# Patient Record
Sex: MALE | Race: WHITE | NOT HISPANIC OR LATINO
[De-identification: names, ages, dates, MRNs, and addresses within clinical notes are randomized per-mention and may not be internally consistent; named-entity substitution may affect disease eponyms.]

---

## 2017-01-09 ENCOUNTER — LABORATORY RESULT (OUTPATIENT)
Age: 67
End: 2017-01-09

## 2017-01-10 ENCOUNTER — APPOINTMENT (OUTPATIENT)
Dept: NEPHROLOGY | Facility: CLINIC | Age: 67
End: 2017-01-10

## 2017-01-10 VITALS — SYSTOLIC BLOOD PRESSURE: 132 MMHG | DIASTOLIC BLOOD PRESSURE: 82 MMHG

## 2017-01-10 VITALS
DIASTOLIC BLOOD PRESSURE: 86 MMHG | BODY MASS INDEX: 31.07 KG/M2 | OXYGEN SATURATION: 95 % | WEIGHT: 205.03 LBS | HEART RATE: 89 BPM | HEIGHT: 68 IN | SYSTOLIC BLOOD PRESSURE: 135 MMHG

## 2017-01-10 DIAGNOSIS — R39.9 UNSPECIFIED SYMPTOMS AND SIGNS INVOLVING THE GENITOURINARY SYSTEM: ICD-10-CM

## 2017-01-17 ENCOUNTER — APPOINTMENT (OUTPATIENT)
Dept: HEMATOLOGY ONCOLOGY | Facility: CLINIC | Age: 67
End: 2017-01-17

## 2017-01-17 VITALS
DIASTOLIC BLOOD PRESSURE: 70 MMHG | BODY MASS INDEX: 31.93 KG/M2 | WEIGHT: 210 LBS | TEMPERATURE: 98.4 F | HEART RATE: 96 BPM | SYSTOLIC BLOOD PRESSURE: 138 MMHG

## 2017-02-06 ENCOUNTER — LABORATORY RESULT (OUTPATIENT)
Age: 67
End: 2017-02-06

## 2017-02-13 LAB
ALBUMIN SERPL ELPH-MCNC: 4.9 G/DL
ALP BLD-CCNC: 52 U/L
ALT SERPL-CCNC: 30 U/L
ANION GAP SERPL CALC-SCNC: 15 MMOL/L
APPEARANCE: CLEAR
AST SERPL-CCNC: 20 U/L
BACTERIA: NEGATIVE
BILIRUB SERPL-MCNC: 0.6 MG/DL
BILIRUBIN URINE: NEGATIVE
BLOOD URINE: NEGATIVE
BUN SERPL-MCNC: 22 MG/DL
CALCIUM SERPL-MCNC: 10.2 MG/DL
CHLORIDE SERPL-SCNC: 98 MMOL/L
CO2 SERPL-SCNC: 30 MMOL/L
COLOR: YELLOW
CREAT SERPL-MCNC: 1.49 MG/DL
CREAT SPEC-SCNC: 74 MG/DL
CREAT SPEC-SCNC: 74 MG/DL
CREAT/PROT UR: NORMAL RATIO
GLUCOSE QUALITATIVE U: NORMAL MG/DL
GLUCOSE SERPL-MCNC: 120 MG/DL
HYALINE CASTS: 0 /LPF
IGA 24H UR QL IFE: NORMAL
KETONES URINE: NEGATIVE
LEUKOCYTE ESTERASE URINE: NEGATIVE
MAGNESIUM SERPL-MCNC: 2 MG/DL
MICROALBUMIN 24H UR DL<=1MG/L-MCNC: 0.5 MG/DL
MICROALBUMIN/CREAT 24H UR-RTO: 7 UG/MG
MICROSCOPIC-UA: NORMAL
NITRITE URINE: NEGATIVE
OSMOLALITY SERPL: 298 MOS/KG
OSMOLALITY UR: 385 MOS/KG
PH URINE: 5.5
PHOSPHATE SERPL-MCNC: 3.8 MG/DL
POTASSIUM SERPL-SCNC: 5.2 MMOL/L
POTASSIUM UR-SCNC: 35 MMOL/L
PROT SERPL-MCNC: 7.4 G/DL
PROT UR-MCNC: <4 MG/DL
PROTEIN URINE: NEGATIVE MG/DL
RED BLOOD CELLS URINE: 0 /HPF
SODIUM ?TM SUB UR QN: 70 MMOL/L
SODIUM SERPL-SCNC: 143 MMOL/L
SPECIFIC GRAVITY URINE: 1.01
SQUAMOUS EPITHELIAL CELLS: 0 /HPF
URATE SERPL-MCNC: 8.6 MG/DL
UROBILINOGEN URINE: NORMAL MG/DL
WHITE BLOOD CELLS URINE: 0 /HPF

## 2017-02-14 ENCOUNTER — LABORATORY RESULT (OUTPATIENT)
Age: 67
End: 2017-02-14

## 2017-02-15 LAB
ALBUMIN SERPL ELPH-MCNC: 4.5 G/DL
ALP BLD-CCNC: 49 U/L
ALT SERPL-CCNC: 78 U/L
AST SERPL-CCNC: 61 U/L
BASOPHILS # BLD AUTO: 0.19 K/UL
BASOPHILS NFR BLD AUTO: 2.6 %
BILIRUB DIRECT SERPL-MCNC: 0.2 MG/DL
BILIRUB INDIRECT SERPL-MCNC: 0.4 MG/DL
BILIRUB SERPL-MCNC: 0.6 MG/DL
CREAT SERPL-MCNC: 1.52 MG/DL
EOSINOPHIL # BLD AUTO: 0.45 K/UL
EOSINOPHIL NFR BLD AUTO: 6.1 %
HCT VFR BLD CALC: 42.3 %
HGB BLD-MCNC: 14.1 G/DL
LYMPHOCYTES # BLD AUTO: 1.02 K/UL
LYMPHOCYTES NFR BLD AUTO: 13.9 %
MAN DIFF?: NORMAL
MCHC RBC-ENTMCNC: 33.3 GM/DL
MCHC RBC-ENTMCNC: 36.7 PG
MCV RBC AUTO: 110.2 FL
MONOCYTES # BLD AUTO: 0.45 K/UL
MONOCYTES NFR BLD AUTO: 6.1 %
NEUTROPHILS # BLD AUTO: 4.92 K/UL
NEUTROPHILS NFR BLD AUTO: 67 %
PLATELET # BLD AUTO: 497 K/UL
PROT SERPL-MCNC: 7.2 G/DL
PSA SERPL-MCNC: 1.17 NG/ML
RBC # BLD: 3.84 M/UL
RBC # FLD: 15.1 %
WBC # FLD AUTO: 7.34 K/UL

## 2017-03-13 ENCOUNTER — LABORATORY RESULT (OUTPATIENT)
Age: 67
End: 2017-03-13

## 2017-03-15 ENCOUNTER — OTHER (OUTPATIENT)
Age: 67
End: 2017-03-15

## 2017-04-03 ENCOUNTER — APPOINTMENT (OUTPATIENT)
Dept: FAMILY MEDICINE | Facility: CLINIC | Age: 67
End: 2017-04-03

## 2017-04-03 VITALS — RESPIRATION RATE: 14 BRPM

## 2017-04-03 VITALS
HEART RATE: 75 BPM | SYSTOLIC BLOOD PRESSURE: 140 MMHG | HEIGHT: 68 IN | TEMPERATURE: 98.9 F | DIASTOLIC BLOOD PRESSURE: 90 MMHG | OXYGEN SATURATION: 94 %

## 2017-04-03 DIAGNOSIS — K43.2 INCISIONAL HERNIA W/OUT OBSTRUCTION OR GANGRENE: ICD-10-CM

## 2017-05-16 ENCOUNTER — LABORATORY RESULT (OUTPATIENT)
Age: 67
End: 2017-05-16

## 2017-05-22 ENCOUNTER — APPOINTMENT (OUTPATIENT)
Dept: HEMATOLOGY ONCOLOGY | Facility: CLINIC | Age: 67
End: 2017-05-22

## 2017-05-22 VITALS
HEART RATE: 76 BPM | SYSTOLIC BLOOD PRESSURE: 150 MMHG | DIASTOLIC BLOOD PRESSURE: 70 MMHG | BODY MASS INDEX: 33.35 KG/M2 | HEIGHT: 66.5 IN | WEIGHT: 210 LBS | RESPIRATION RATE: 16 BRPM | TEMPERATURE: 98.5 F

## 2017-05-22 DIAGNOSIS — Z87.891 PERSONAL HISTORY OF NICOTINE DEPENDENCE: ICD-10-CM

## 2017-05-23 ENCOUNTER — APPOINTMENT (OUTPATIENT)
Dept: HEMATOLOGY ONCOLOGY | Facility: CLINIC | Age: 67
End: 2017-05-23

## 2017-05-30 LAB
ALBUMIN SERPL ELPH-MCNC: 5.4 G/DL
ALP BLD-CCNC: 62 U/L
ALT SERPL-CCNC: 42 U/L
ANION GAP SERPL CALC-SCNC: 19 MMOL/L
APPEARANCE: CLEAR
AST SERPL-CCNC: 27 U/L
BACTERIA: NEGATIVE
BILIRUB SERPL-MCNC: 0.5 MG/DL
BILIRUBIN URINE: NEGATIVE
BLOOD URINE: NEGATIVE
BUN SERPL-MCNC: 21 MG/DL
CALCIUM SERPL-MCNC: 10.4 MG/DL
CALCIUM SERPL-MCNC: 10.4 MG/DL
CHLORIDE SERPL-SCNC: 99 MMOL/L
CHOLEST SERPL-MCNC: 266 MG/DL
CHOLEST/HDLC SERPL: 5.1 RATIO
CO2 SERPL-SCNC: 26 MMOL/L
COLOR: YELLOW
CREAT SERPL-MCNC: 1.48 MG/DL
CREAT SPEC-SCNC: 85 MG/DL
CREAT/PROT UR: 0.1
GLUCOSE QUALITATIVE U: NORMAL MG/DL
GLUCOSE SERPL-MCNC: 103 MG/DL
HBA1C MFR BLD HPLC: 6.3 %
HDLC SERPL-MCNC: 52 MG/DL
HYALINE CASTS: 1 /LPF
IGA 24H UR QL IFE: NORMAL
KETONES URINE: NEGATIVE
LDLC SERPL CALC-MCNC: 190 MG/DL
LEUKOCYTE ESTERASE URINE: NEGATIVE
MAGNESIUM SERPL-MCNC: 2.1 MG/DL
MICROSCOPIC-UA: NORMAL
NITRITE URINE: NEGATIVE
OSMOLALITY SERPL: 308 MOS/KG
OSMOLALITY UR: 499 MOS/KG
PARATHYROID HORMONE INTACT: 51 PG/ML
PH URINE: 5.5
PHOSPHATE SERPL-MCNC: 3.8 MG/DL
POTASSIUM SERPL-SCNC: 5 MMOL/L
POTASSIUM UR-SCNC: 51 MMOL/L
PROT SERPL-MCNC: 7.9 G/DL
PROT UR-MCNC: 6 MG/DL
PROTEIN URINE: NEGATIVE MG/DL
PSA FREE FLD-MCNC: 20
PSA FREE SERPL-MCNC: 0.25 NG/ML
PSA SERPL-MCNC: 1.25 NG/ML
RED BLOOD CELLS URINE: 1 /HPF
SODIUM ?TM SUB UR QN: 64 MMOL/L
SODIUM SERPL-SCNC: 144 MMOL/L
SPECIFIC GRAVITY URINE: 1.01
SQUAMOUS EPITHELIAL CELLS: 0 /HPF
TRIGL SERPL-MCNC: 119 MG/DL
URATE SERPL-MCNC: 10.2 MG/DL
UROBILINOGEN URINE: NORMAL MG/DL
WHITE BLOOD CELLS URINE: 1 /HPF

## 2017-06-13 ENCOUNTER — APPOINTMENT (OUTPATIENT)
Dept: NEPHROLOGY | Facility: CLINIC | Age: 67
End: 2017-06-13

## 2017-06-13 VITALS
HEIGHT: 66.5 IN | DIASTOLIC BLOOD PRESSURE: 80 MMHG | WEIGHT: 205 LBS | OXYGEN SATURATION: 98 % | BODY MASS INDEX: 32.56 KG/M2 | HEART RATE: 86 BPM | SYSTOLIC BLOOD PRESSURE: 124 MMHG

## 2017-06-13 VITALS — SYSTOLIC BLOOD PRESSURE: 128 MMHG | DIASTOLIC BLOOD PRESSURE: 78 MMHG

## 2017-07-10 ENCOUNTER — LABORATORY RESULT (OUTPATIENT)
Age: 67
End: 2017-07-10

## 2017-07-11 LAB
BASOPHILS # BLD AUTO: 0.42 K/UL
BASOPHILS NFR BLD AUTO: 3.5 %
EOSINOPHIL # BLD AUTO: 1.06 K/UL
EOSINOPHIL NFR BLD AUTO: 8.8 %
HCT VFR BLD CALC: 45.3 %
HGB BLD-MCNC: 15.3 G/DL
LYMPHOCYTES # BLD AUTO: 1.6 K/UL
LYMPHOCYTES NFR BLD AUTO: 13.2 %
MAN DIFF?: NORMAL
MCHC RBC-ENTMCNC: 33.8 GM/DL
MCHC RBC-ENTMCNC: 35.9 PG
MCV RBC AUTO: 106.3 FL
MONOCYTES # BLD AUTO: 0.96 K/UL
MONOCYTES NFR BLD AUTO: 7.9 %
NEUTROPHILS # BLD AUTO: 7.42 K/UL
NEUTROPHILS NFR BLD AUTO: 61.4 %
PLATELET # BLD AUTO: 583 K/UL
RBC # BLD: 4.26 M/UL
RBC # FLD: 14.6 %
WBC # FLD AUTO: 12.09 K/UL

## 2017-08-15 ENCOUNTER — MEDICATION RENEWAL (OUTPATIENT)
Age: 67
End: 2017-08-15

## 2017-08-23 ENCOUNTER — MEDICATION RENEWAL (OUTPATIENT)
Age: 67
End: 2017-08-23

## 2017-09-11 ENCOUNTER — LABORATORY RESULT (OUTPATIENT)
Age: 67
End: 2017-09-11

## 2017-09-12 LAB
ALBUMIN SERPL ELPH-MCNC: 4.9 G/DL
ALP BLD-CCNC: 51 U/L
ALT SERPL-CCNC: 30 U/L
AST SERPL-CCNC: 38 U/L
BASOPHILS # BLD AUTO: 0.46 K/UL
BASOPHILS NFR BLD AUTO: 4.3 %
BILIRUB DIRECT SERPL-MCNC: 0.2 MG/DL
BILIRUB INDIRECT SERPL-MCNC: 0.5 MG/DL
BILIRUB SERPL-MCNC: 0.7 MG/DL
CREAT SERPL-MCNC: 1.42 MG/DL
EOSINOPHIL # BLD AUTO: 0.36 K/UL
EOSINOPHIL NFR BLD AUTO: 3.4 %
HCT VFR BLD CALC: 45.8 %
HGB BLD-MCNC: 15.4 G/DL
LYMPHOCYTES # BLD AUTO: 1.83 K/UL
LYMPHOCYTES NFR BLD AUTO: 17.1 %
MAN DIFF?: NORMAL
MCHC RBC-ENTMCNC: 33.6 GM/DL
MCHC RBC-ENTMCNC: 35.3 PG
MCV RBC AUTO: 105 FL
MONOCYTES # BLD AUTO: 0.64 K/UL
MONOCYTES NFR BLD AUTO: 6 %
NEUTROPHILS # BLD AUTO: 7.21 K/UL
NEUTROPHILS NFR BLD AUTO: 67.5 %
PLATELET # BLD AUTO: 572 K/UL
PROT SERPL-MCNC: 8.1 G/DL
RBC # BLD: 4.36 M/UL
RBC # FLD: 14.8 %
WBC # FLD AUTO: 10.68 K/UL

## 2017-09-22 ENCOUNTER — APPOINTMENT (OUTPATIENT)
Dept: HEMATOLOGY ONCOLOGY | Facility: CLINIC | Age: 67
End: 2017-09-22
Payer: MEDICARE

## 2017-09-22 VITALS
DIASTOLIC BLOOD PRESSURE: 70 MMHG | WEIGHT: 208 LBS | TEMPERATURE: 98.7 F | HEART RATE: 98 BPM | BODY MASS INDEX: 33.07 KG/M2 | SYSTOLIC BLOOD PRESSURE: 144 MMHG

## 2017-09-22 PROCEDURE — 99214 OFFICE O/P EST MOD 30 MIN: CPT

## 2017-11-16 ENCOUNTER — LABORATORY RESULT (OUTPATIENT)
Age: 67
End: 2017-11-16

## 2017-11-21 LAB
25(OH)D3 SERPL-MCNC: 50.2 NG/ML
ALBUMIN SERPL ELPH-MCNC: 5 G/DL
ALP BLD-CCNC: 51 U/L
ALT SERPL-CCNC: 24 U/L
ANION GAP SERPL CALC-SCNC: 15 MMOL/L
APPEARANCE: CLEAR
AST SERPL-CCNC: 29 U/L
BACTERIA: NEGATIVE
BASOPHILS # BLD AUTO: 0.32 K/UL
BASOPHILS NFR BLD AUTO: 2.7 %
BILIRUB SERPL-MCNC: 0.6 MG/DL
BILIRUBIN URINE: NEGATIVE
BLOOD URINE: NEGATIVE
BUN SERPL-MCNC: 30 MG/DL
CALCIUM SERPL-MCNC: 10.9 MG/DL
CALCIUM SERPL-MCNC: 10.9 MG/DL
CHLORIDE SERPL-SCNC: 98 MMOL/L
CHOLEST SERPL-MCNC: 258 MG/DL
CHOLEST/HDLC SERPL: 6.6 RATIO
CO2 SERPL-SCNC: 29 MMOL/L
COLOR: YELLOW
CREAT SERPL-MCNC: 1.57 MG/DL
CREAT SPEC-SCNC: 139 MG/DL
CREAT SPEC-SCNC: 139 MG/DL
CREAT/PROT UR: 0.1 RATIO
EOSINOPHIL # BLD AUTO: 0.42 K/UL
EOSINOPHIL NFR BLD AUTO: 3.5
FERRITIN SERPL-MCNC: 290 NG/ML
GLUCOSE QUALITATIVE U: NEGATIVE MG/DL
GLUCOSE SERPL-MCNC: 108 MG/DL
HCT VFR BLD CALC: 46.5 %
HDLC SERPL-MCNC: 39 MG/DL
HGB BLD-MCNC: 15.6 G/DL
IGA 24H UR QL IFE: NORMAL
IRON SATN MFR SERPL: 32 %
IRON SERPL-MCNC: 109 UG/DL
KETONES URINE: NEGATIVE
LDLC SERPL CALC-MCNC: 167 MG/DL
LEUKOCYTE ESTERASE URINE: NEGATIVE
LYMPHOCYTES # BLD AUTO: 1.9 K/UL
LYMPHOCYTES NFR BLD AUTO: 15.9 %
MAGNESIUM SERPL-MCNC: 2.3 MG/DL
MAN DIFF?: NORMAL
MCHC RBC-ENTMCNC: 33.5 GM/DL
MCHC RBC-ENTMCNC: 35.7 PG
MCV RBC AUTO: 106.4 FL
MICROALBUMIN 24H UR DL<=1MG/L-MCNC: 0.8 MG/DL
MICROALBUMIN/CREAT 24H UR-RTO: 6 MG/G
MICROSCOPIC-UA: NORMAL
MONOCYTES # BLD AUTO: 0.42 K/UL
MONOCYTES NFR BLD AUTO: 3.5 %
NEUTROPHILS # BLD AUTO: 8.66 K/UL
NEUTROPHILS NFR BLD AUTO: 72.6 %
NITRITE URINE: NEGATIVE
OSMOLALITY SERPL: 308 MOS/KG
OSMOLALITY UR: 518 MOS/KG
PARATHYROID HORMONE INTACT: 34 PG/ML
PH URINE: 5
PHOSPHATE SERPL-MCNC: 3.5 MG/DL
PLATELET # BLD AUTO: 616 K/UL
POTASSIUM SERPL-SCNC: 4.8 MMOL/L
POTASSIUM UR-SCNC: 55 MMOL/L
PROT SERPL-MCNC: 8.1 G/DL
PROT UR-MCNC: 7 MG/DL
PROTEIN URINE: NEGATIVE MG/DL
RBC # BLD: 4.37 M/UL
RBC # FLD: 15 %
RED BLOOD CELLS URINE: 1 /HPF
SODIUM ?TM SUB UR QN: 27 MMOL/L
SODIUM SERPL-SCNC: 142 MMOL/L
SPECIFIC GRAVITY URINE: 1.02
SQUAMOUS EPITHELIAL CELLS: 0 /HPF
TIBC SERPL-MCNC: 339 UG/DL
TRIGL SERPL-MCNC: 260 MG/DL
TSH SERPL-ACNC: 3.81 UIU/ML
UIBC SERPL-MCNC: 230 UG/DL
URATE SERPL-MCNC: 9.9 MG/DL
UROBILINOGEN URINE: NEGATIVE MG/DL
WBC # FLD AUTO: 11.93 K/UL
WHITE BLOOD CELLS URINE: 1 /HPF

## 2017-12-13 ENCOUNTER — RX RENEWAL (OUTPATIENT)
Age: 67
End: 2017-12-13

## 2018-01-11 ENCOUNTER — LABORATORY RESULT (OUTPATIENT)
Age: 68
End: 2018-01-11

## 2018-01-11 LAB
ALBUMIN SERPL ELPH-MCNC: 4.9 G/DL
ALP BLD-CCNC: 42 U/L
ALT SERPL-CCNC: 38 U/L
AST SERPL-CCNC: 24 U/L
BILIRUB DIRECT SERPL-MCNC: 0.1 MG/DL
BILIRUB INDIRECT SERPL-MCNC: 0.3 MG/DL
BILIRUB SERPL-MCNC: 0.4 MG/DL
CREAT SERPL-MCNC: 1.42 MG/DL
PROT SERPL-MCNC: 7.5 G/DL

## 2018-01-12 ENCOUNTER — APPOINTMENT (OUTPATIENT)
Dept: HEMATOLOGY ONCOLOGY | Facility: CLINIC | Age: 68
End: 2018-01-12
Payer: MEDICARE

## 2018-01-12 VITALS
OXYGEN SATURATION: 97 % | DIASTOLIC BLOOD PRESSURE: 80 MMHG | BODY MASS INDEX: 33.39 KG/M2 | TEMPERATURE: 98.7 F | HEART RATE: 82 BPM | WEIGHT: 210 LBS | SYSTOLIC BLOOD PRESSURE: 156 MMHG

## 2018-01-12 LAB
BASOPHILS # BLD AUTO: 0.42 K/UL
BASOPHILS NFR BLD AUTO: 3.5 %
EOSINOPHIL # BLD AUTO: 0.53 K/UL
EOSINOPHIL NFR BLD AUTO: 4.4
HCT VFR BLD CALC: 46.3 %
HGB BLD-MCNC: 15.4 G/DL
LYMPHOCYTES # BLD AUTO: 1.37 K/UL
LYMPHOCYTES NFR BLD AUTO: 11.4 %
MAN DIFF?: NORMAL
MCHC RBC-ENTMCNC: 33.3 GM/DL
MCHC RBC-ENTMCNC: 35.3 PG
MCV RBC AUTO: 106.2 FL
MONOCYTES # BLD AUTO: 0.84 K/UL
MONOCYTES NFR BLD AUTO: 7 %
NEUTROPHILS # BLD AUTO: 8.04 K/UL
NEUTROPHILS NFR BLD AUTO: 66.7 %
PLATELET # BLD AUTO: 609 K/UL
RBC # BLD: 4.36 M/UL
RBC # FLD: 14.5 %
WBC # FLD AUTO: 12.06 K/UL

## 2018-01-12 PROCEDURE — 99214 OFFICE O/P EST MOD 30 MIN: CPT

## 2018-03-06 ENCOUNTER — LABORATORY RESULT (OUTPATIENT)
Age: 68
End: 2018-03-06

## 2018-03-07 LAB
BASOPHILS # BLD AUTO: 0.29 K/UL
BASOPHILS NFR BLD AUTO: 2 %
EOSINOPHIL # BLD AUTO: 0.81 K/UL
EOSINOPHIL NFR BLD AUTO: 5.5 %
HCT VFR BLD CALC: 45.8 %
HGB BLD-MCNC: 15.2 G/DL
IMM GRANULOCYTES NFR BLD AUTO: 4.6 %
LYMPHOCYTES # BLD AUTO: 2.08 K/UL
LYMPHOCYTES NFR BLD AUTO: 14.1 %
MAN DIFF?: NORMAL
MCHC RBC-ENTMCNC: 33.2 GM/DL
MCHC RBC-ENTMCNC: 34.9 PG
MCV RBC AUTO: 105.3 FL
MONOCYTES # BLD AUTO: 0.76 K/UL
MONOCYTES NFR BLD AUTO: 5.2 %
NEUTROPHILS # BLD AUTO: 10.1 K/UL
NEUTROPHILS NFR BLD AUTO: 68.6 %
PLATELET # BLD AUTO: 684 K/UL
RBC # BLD: 4.35 M/UL
RBC # FLD: 15.2 %
WBC # FLD AUTO: 14.71 K/UL

## 2018-03-08 ENCOUNTER — OTHER (OUTPATIENT)
Age: 68
End: 2018-03-08

## 2018-05-08 ENCOUNTER — LABORATORY RESULT (OUTPATIENT)
Age: 68
End: 2018-05-08

## 2018-05-08 LAB
ALBUMIN SERPL ELPH-MCNC: 4.7 G/DL
ALP BLD-CCNC: 51 U/L
ALT SERPL-CCNC: 37 U/L
AST SERPL-CCNC: 26 U/L
BASOPHILS # BLD AUTO: 0.31 K/UL
BASOPHILS NFR BLD AUTO: 2.7 %
BILIRUB DIRECT SERPL-MCNC: 0.1 MG/DL
BILIRUB INDIRECT SERPL-MCNC: 0.3 MG/DL
BILIRUB SERPL-MCNC: 0.4 MG/DL
CREAT SERPL-MCNC: 1.38 MG/DL
EOSINOPHIL # BLD AUTO: 0.62 K/UL
EOSINOPHIL NFR BLD AUTO: 5.4 %
HCT VFR BLD CALC: 46.7 %
HGB BLD-MCNC: 15.4 G/DL
LYMPHOCYTES # BLD AUTO: 1.65 K/UL
LYMPHOCYTES NFR BLD AUTO: 14.4 %
MAN DIFF?: NORMAL
MCHC RBC-ENTMCNC: 33 GM/DL
MCHC RBC-ENTMCNC: 35 PG
MCV RBC AUTO: 106.1 FL
MONOCYTES # BLD AUTO: 0.51 K/UL
MONOCYTES NFR BLD AUTO: 4.5 %
NEUTROPHILS # BLD AUTO: 7.73 K/UL
NEUTROPHILS NFR BLD AUTO: 65.8 %
PLATELET # BLD AUTO: 553 K/UL
PROT SERPL-MCNC: 7.3 G/DL
RBC # BLD: 4.4 M/UL
RBC # FLD: 15.6 %
WBC # FLD AUTO: 11.44 K/UL

## 2018-05-24 ENCOUNTER — RX RENEWAL (OUTPATIENT)
Age: 68
End: 2018-05-24

## 2018-05-31 ENCOUNTER — APPOINTMENT (OUTPATIENT)
Dept: HEMATOLOGY ONCOLOGY | Facility: CLINIC | Age: 68
End: 2018-05-31
Payer: MEDICARE

## 2018-05-31 VITALS
DIASTOLIC BLOOD PRESSURE: 50 MMHG | BODY MASS INDEX: 32.75 KG/M2 | TEMPERATURE: 98.8 F | SYSTOLIC BLOOD PRESSURE: 140 MMHG | WEIGHT: 206 LBS | RESPIRATION RATE: 16 BRPM | HEART RATE: 88 BPM

## 2018-05-31 DIAGNOSIS — Z87.09 PERSONAL HISTORY OF OTHER DISEASES OF THE RESPIRATORY SYSTEM: ICD-10-CM

## 2018-05-31 PROCEDURE — 99214 OFFICE O/P EST MOD 30 MIN: CPT

## 2018-06-05 LAB
ALBUMIN SERPL ELPH-MCNC: 4.5 G/DL
ALBUMIN SERPL ELPH-MCNC: 4.5 G/DL
ALP BLD-CCNC: 50 U/L
ALT SERPL-CCNC: 26 U/L
ANION GAP SERPL CALC-SCNC: 15 MMOL/L
ANISOCYTOSIS BLD QL: SLIGHT
AST SERPL-CCNC: 31 U/L
BASOPHILS # BLD AUTO: 0.28 K/UL
BASOPHILS NFR BLD AUTO: 2 %
BILIRUB DIRECT SERPL-MCNC: 0.2 MG/DL
BILIRUB INDIRECT SERPL-MCNC: 0.3 MG/DL
BILIRUB SERPL-MCNC: 0.5 MG/DL
BUN SERPL-MCNC: 19 MG/DL
CALCIUM SERPL-MCNC: 10 MG/DL
CHLORIDE SERPL-SCNC: 97 MMOL/L
CO2 SERPL-SCNC: 27 MMOL/L
CREAT SERPL-MCNC: 1.37 MG/DL
EOSINOPHIL # BLD AUTO: 0.41 K/UL
EOSINOPHIL NFR BLD AUTO: 3 %
GLUCOSE SERPL-MCNC: 130 MG/DL
HCT VFR BLD CALC: 44.6 %
HGB BLD-MCNC: 14.9 G/DL
LYMPHOCYTES # BLD AUTO: 2.07 K/UL
LYMPHOCYTES NFR BLD AUTO: 15 %
MACROCYTES BLD QL SMEAR: NORMAL
MAN DIFF?: NORMAL
MCHC RBC-ENTMCNC: 33.4 GM/DL
MCHC RBC-ENTMCNC: 35.1 PG
MCV RBC AUTO: 104.9 FL
MONOCYTES # BLD AUTO: 0.83 K/UL
MONOCYTES NFR BLD AUTO: 6 %
MSMEAR: NORMAL
MYELOCYTES NFR BLD: 1 %
NEUTROPHILS # BLD AUTO: 9.81 K/UL
NEUTROPHILS NFR BLD AUTO: 71 %
NRBC # BLD MANUAL: 0 /100
PHOSPHATE SERPL-MCNC: 2.9 MG/DL
PLAT MORPH BLD: NORMAL
PLATELET # BLD AUTO: 586 K/UL
POTASSIUM SERPL-SCNC: 4.6 MMOL/L
PROT SERPL-MCNC: 7.3 G/DL
RBC # BLD: 4.25 M/UL
RBC # FLD: 15.3 %
RBC BLD AUTO: ABNORMAL
SODIUM SERPL-SCNC: 139 MMOL/L
VARIANT LYMPHS # BLD MANUAL: 2 %
WBC # FLD AUTO: 13.82 K/UL

## 2018-06-13 ENCOUNTER — APPOINTMENT (OUTPATIENT)
Dept: NEPHROLOGY | Facility: CLINIC | Age: 68
End: 2018-06-13
Payer: MEDICARE

## 2018-06-13 VITALS
OXYGEN SATURATION: 96 % | SYSTOLIC BLOOD PRESSURE: 136 MMHG | BODY MASS INDEX: 32.21 KG/M2 | HEIGHT: 66.5 IN | HEART RATE: 79 BPM | WEIGHT: 202.82 LBS | DIASTOLIC BLOOD PRESSURE: 76 MMHG

## 2018-06-13 VITALS — SYSTOLIC BLOOD PRESSURE: 134 MMHG | DIASTOLIC BLOOD PRESSURE: 74 MMHG

## 2018-06-13 DIAGNOSIS — R35.1 NOCTURIA: ICD-10-CM

## 2018-06-13 PROCEDURE — 99214 OFFICE O/P EST MOD 30 MIN: CPT

## 2018-06-14 PROBLEM — R35.1 NOCTURIA: Status: ACTIVE | Noted: 2017-01-10

## 2018-08-10 ENCOUNTER — LABORATORY RESULT (OUTPATIENT)
Age: 68
End: 2018-08-10

## 2018-08-11 LAB
BASOPHILS # BLD AUTO: 0.45 K/UL
BASOPHILS NFR BLD AUTO: 3.4 %
EOSINOPHIL # BLD AUTO: 0.57 K/UL
EOSINOPHIL NFR BLD AUTO: 4.3 %
HCT VFR BLD CALC: 44.2 %
HGB BLD-MCNC: 15.2 G/DL
LYMPHOCYTES # BLD AUTO: 1.91 K/UL
LYMPHOCYTES NFR BLD AUTO: 14.5 %
MAN DIFF?: NORMAL
MCHC RBC-ENTMCNC: 34.4 GM/DL
MCHC RBC-ENTMCNC: 35.1 PG
MCV RBC AUTO: 102.1 FL
MONOCYTES # BLD AUTO: 0.67 K/UL
MONOCYTES NFR BLD AUTO: 5.1 %
NEUTROPHILS # BLD AUTO: 8.65 K/UL
NEUTROPHILS NFR BLD AUTO: 63.2 %
PLATELET # BLD AUTO: 518 K/UL
RBC # BLD: 4.33 M/UL
RBC # FLD: 15.7 %
WBC # FLD AUTO: 13.14 K/UL

## 2018-09-24 ENCOUNTER — LABORATORY RESULT (OUTPATIENT)
Age: 68
End: 2018-09-24

## 2018-09-24 LAB
ALBUMIN SERPL ELPH-MCNC: 5.4 G/DL
ALP BLD-CCNC: 53 U/L
ALT SERPL-CCNC: 34 U/L
AST SERPL-CCNC: 26 U/L
BILIRUB DIRECT SERPL-MCNC: 0.1 MG/DL
BILIRUB INDIRECT SERPL-MCNC: 0.4 MG/DL
BILIRUB SERPL-MCNC: 0.5 MG/DL
CREAT SERPL-MCNC: 1.37 MG/DL
PROT SERPL-MCNC: 7.9 G/DL

## 2018-09-25 LAB
BASOPHILS # BLD AUTO: 0.36 K/UL
BASOPHILS NFR BLD AUTO: 2.6 %
EOSINOPHIL # BLD AUTO: 0.41 K/UL
EOSINOPHIL NFR BLD AUTO: 3 %
HCT VFR BLD CALC: 49.3 %
HGB BLD-MCNC: 16.1 G/DL
IMM GRANULOCYTES NFR BLD AUTO: 4.5 %
LYMPHOCYTES # BLD AUTO: 1.9 K/UL
LYMPHOCYTES NFR BLD AUTO: 13.7 %
MAN DIFF?: NORMAL
MCHC RBC-ENTMCNC: 32.7 GM/DL
MCHC RBC-ENTMCNC: 34 PG
MCV RBC AUTO: 104.2 FL
MONOCYTES # BLD AUTO: 0.63 K/UL
MONOCYTES NFR BLD AUTO: 4.5 %
NEUTROPHILS # BLD AUTO: 9.96 K/UL
NEUTROPHILS NFR BLD AUTO: 71.7 %
PLATELET # BLD AUTO: 564 K/UL
RBC # BLD: 4.73 M/UL
RBC # FLD: 15.7 %
WBC # FLD AUTO: 13.88 K/UL

## 2018-09-27 ENCOUNTER — APPOINTMENT (OUTPATIENT)
Dept: HEMATOLOGY ONCOLOGY | Facility: CLINIC | Age: 68
End: 2018-09-27

## 2018-10-30 ENCOUNTER — APPOINTMENT (OUTPATIENT)
Dept: FAMILY MEDICINE | Facility: CLINIC | Age: 68
End: 2018-10-30
Payer: MEDICARE

## 2018-10-30 VITALS
HEART RATE: 93 BPM | HEIGHT: 66.5 IN | SYSTOLIC BLOOD PRESSURE: 148 MMHG | TEMPERATURE: 98.6 F | OXYGEN SATURATION: 92 % | BODY MASS INDEX: 30.97 KG/M2 | WEIGHT: 195 LBS | DIASTOLIC BLOOD PRESSURE: 86 MMHG

## 2018-10-30 DIAGNOSIS — Z91.81 HISTORY OF FALLING: ICD-10-CM

## 2018-10-30 DIAGNOSIS — S09.90XA UNSPECIFIED INJURY OF HEAD, INITIAL ENCOUNTER: ICD-10-CM

## 2018-10-30 PROCEDURE — 99214 OFFICE O/P EST MOD 30 MIN: CPT

## 2018-10-30 RX ORDER — AZITHROMYCIN 250 MG/1
250 TABLET, FILM COATED ORAL
Qty: 6 | Refills: 0 | Status: COMPLETED | COMMUNITY
Start: 2018-05-31 | End: 2018-10-30

## 2018-10-30 NOTE — PHYSICAL EXAM
[No Acute Distress] : no acute distress [Normal Sclera/Conjunctiva] : normal sclera/conjunctiva [Normal Outer Ear/Nose] : the outer ears and nose were normal in appearance [No JVD] : no jugular venous distention [Supple] : supple [No Respiratory Distress] : no respiratory distress  [Clear to Auscultation] : lungs were clear to auscultation bilaterally [No Accessory Muscle Use] : no accessory muscle use [Normal Rate] : normal rate  [Regular Rhythm] : with a regular rhythm [Normal S1, S2] : normal S1 and S2 [No Varicosities] : no varicosities [No Edema] : there was no peripheral edema [Soft] : abdomen soft [Non Tender] : non-tender [Non-distended] : non-distended [No Masses] : no abdominal mass palpated [No HSM] : no HSM [Normal Bowel Sounds] : normal bowel sounds [Grossly Normal Strength/Tone] : grossly normal strength/tone [Normal Gait] : normal gait [Coordination Grossly Intact] : coordination grossly intact [Alert and Oriented x3] : oriented to person, place, and time [de-identified] : neck and lower back - + tenderness, no hematoma, neck + FROM, right shoulder - decreased ROM

## 2018-10-30 NOTE — HISTORY OF PRESENT ILLNESS
[FreeTextEntry8] : cc. f/u   eval - s/p mechanical fall on  10/21/18 in [place] , neck and lower back pain, right upper extremities pain, right shoulder pain , + head injury. \par Patient  c/o neck pain ,7/10, dull , worse with movement  radiating to the  right arm , decreased right ute motion, no HA , no CP,no SOB.  Pt also c/o back pain , dull, 7/10, worse with movement ,  Patient was taking Tylenol - no improvement, Patient cannot take NSAID's due to his CRF. Patient refused ED eval as per  note, Was referred for MRI in [place] did not go.

## 2018-10-30 NOTE — REVIEW OF SYSTEMS
[Joint Pain] : joint pain [Joint Stiffness] : joint stiffness [Muscle Pain] : muscle pain [Negative] : Neurological [Muscle Weakness] : no muscle weakness [Back Pain] : no back pain [Joint Swelling] : no joint swelling

## 2018-10-31 ENCOUNTER — RX RENEWAL (OUTPATIENT)
Age: 68
End: 2018-10-31

## 2018-11-05 ENCOUNTER — FORM ENCOUNTER (OUTPATIENT)
Age: 68
End: 2018-11-05

## 2018-11-06 ENCOUNTER — APPOINTMENT (OUTPATIENT)
Dept: RADIOLOGY | Facility: HOSPITAL | Age: 68
End: 2018-11-06

## 2018-11-06 ENCOUNTER — APPOINTMENT (OUTPATIENT)
Dept: CT IMAGING | Facility: HOSPITAL | Age: 68
End: 2018-11-06
Payer: MEDICARE

## 2018-11-06 ENCOUNTER — OUTPATIENT (OUTPATIENT)
Dept: OUTPATIENT SERVICES | Facility: HOSPITAL | Age: 68
LOS: 1 days | End: 2018-11-06
Payer: MEDICARE

## 2018-11-06 DIAGNOSIS — S09.90XA UNSPECIFIED INJURY OF HEAD, INITIAL ENCOUNTER: ICD-10-CM

## 2018-11-06 PROCEDURE — 73030 X-RAY EXAM OF SHOULDER: CPT | Mod: 26,RT

## 2018-11-06 PROCEDURE — 72125 CT NECK SPINE W/O DYE: CPT

## 2018-11-06 PROCEDURE — 70450 CT HEAD/BRAIN W/O DYE: CPT

## 2018-11-06 PROCEDURE — 73030 X-RAY EXAM OF SHOULDER: CPT

## 2018-11-06 PROCEDURE — 72131 CT LUMBAR SPINE W/O DYE: CPT

## 2018-11-06 PROCEDURE — 72125 CT NECK SPINE W/O DYE: CPT | Mod: 26

## 2018-11-06 PROCEDURE — 70450 CT HEAD/BRAIN W/O DYE: CPT | Mod: 26

## 2018-11-06 PROCEDURE — 72131 CT LUMBAR SPINE W/O DYE: CPT | Mod: 26

## 2018-11-19 ENCOUNTER — APPOINTMENT (OUTPATIENT)
Dept: HEMATOLOGY ONCOLOGY | Facility: CLINIC | Age: 68
End: 2018-11-19
Payer: MEDICARE

## 2018-11-19 VITALS
HEART RATE: 80 BPM | DIASTOLIC BLOOD PRESSURE: 80 MMHG | BODY MASS INDEX: 32.12 KG/M2 | WEIGHT: 202 LBS | SYSTOLIC BLOOD PRESSURE: 130 MMHG

## 2018-11-19 PROCEDURE — 99214 OFFICE O/P EST MOD 30 MIN: CPT

## 2018-11-19 NOTE — HISTORY OF PRESENT ILLNESS
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 Flow cytometry of the bone marrow aspirate lymphocytes showed no diagnostic abnormalities. Reticulin stain showed a slight increase and trichrome stain was negative for collagen (grade 1 fibrosis). Bone marrow aspirate iron stain showed iron stores present with ringed sideroblasts (less than 15% of cells). Quantitative BCR-ABL by RT-PCR negative. Cytogenetics showed a normal male karyotype. MDS FISH panel negative. Maintained on Hydrea therapy.\par \par History of renal cancer 10/2014-stage I(pT1a), status post right partial nephrectomy\par \par History of bladder cancer (noninvasive) 1994- status post TURBT. [de-identified] : Had accident in elevator while in [place] last month-suffered neck/back injuries-still recovering from this.\par Continues in followup at [facility] for history of bladder/renal cancers.\par No complaints of headache, pruritus, chest pain, shortness of breath, abdominal pain.\par

## 2018-11-19 NOTE — ASSESSMENT
[FreeTextEntry1] : Myeloproliferative disorder-most recent lab work available reviewed. Patient to continue Hydrea/aspirin (potential side effects reviewed). Interval followup lab work to be done.\par Patient was given the opportunity to ask questions. His questions have been answered to his satisfaction. He expressed his understanding and willingness to comply with recommended followup.

## 2018-11-19 NOTE — PHYSICAL EXAM
[Fully active, able to carry on all pre-disease performance without restriction] : Status 0 - Fully active, able to carry on all pre-disease performance without restriction [Normal] : affect appropriate [de-identified] : non-toxic appearing [de-identified] : soft, NT without palpable hepatosplenomegaly:+ ventral hernia-soft, NT

## 2018-11-19 NOTE — CONSULT LETTER
[Dear  ___] : Dear  [unfilled], [Courtesy Letter:] : I had the pleasure of seeing your patient, [unfilled], in my office today. [Please see my note below.] : Please see my note below. [Consult Closing:] : Thank you very much for allowing me to participate in the care of this patient.  If you have any questions, please do not hesitate to contact me. [Sincerely,] : Sincerely, [FreeTextEntry3] : Katie Fernandez M.D.

## 2018-11-26 ENCOUNTER — RX RENEWAL (OUTPATIENT)
Age: 68
End: 2018-11-26

## 2018-12-07 ENCOUNTER — APPOINTMENT (OUTPATIENT)
Dept: FAMILY MEDICINE | Facility: CLINIC | Age: 68
End: 2018-12-07
Payer: MEDICARE

## 2018-12-07 VITALS — RESPIRATION RATE: 16 BRPM

## 2018-12-07 VITALS
TEMPERATURE: 98.5 F | BODY MASS INDEX: 31.82 KG/M2 | DIASTOLIC BLOOD PRESSURE: 80 MMHG | WEIGHT: 198 LBS | SYSTOLIC BLOOD PRESSURE: 134 MMHG | HEART RATE: 73 BPM | HEIGHT: 66 IN | OXYGEN SATURATION: 95 %

## 2018-12-07 DIAGNOSIS — M54.41 LUMBAGO WITH SCIATICA, RIGHT SIDE: ICD-10-CM

## 2018-12-07 PROCEDURE — 99214 OFFICE O/P EST MOD 30 MIN: CPT

## 2018-12-07 RX ORDER — CLONAZEPAM 0.5 MG/1
0.5 TABLET ORAL 3 TIMES DAILY
Qty: 21 | Refills: 0 | Status: COMPLETED | COMMUNITY
Start: 2018-10-30 | End: 2018-12-07

## 2018-12-07 NOTE — HISTORY OF PRESENT ILLNESS
[FreeTextEntry1] : See history of present illness [de-identified] : Patient is a 67-year-old gentleman, who presents today for followup appointment. Medical history significant for generalized anxiety, hypertension, and chronic renal insufficiency. Patient is complaining of right-sided low back pain, neck and shoulder pain status post a fall in an elevator while in [place]. Patient still complaining of pain. Workup is in progress. Patient presently being seen by orthopedic surgery.

## 2018-12-07 NOTE — REVIEW OF SYSTEMS
[Joint Pain] : joint pain [Joint Stiffness] : joint stiffness [Muscle Pain] : muscle pain [Back Pain] : back pain [Joint Swelling] : no joint swelling [Negative] : Heme/Lymph

## 2018-12-07 NOTE — PHYSICAL EXAM

## 2018-12-07 NOTE — ASSESSMENT
[FreeTextEntry1] : Assessment and plan:\par \par 1. Status post fall. Residual back and neck spasm pain medications renewed. There is no sign of abuse. Patient needs relief from pain. Will be following up with orthopedic surgeon.\par \par 2. Anxiety well controlled with Klonopin 1 mg twice a day as needed.\par \par 3. Hypertension. Continue clonidine 0.1 mg twice a day. Okay. Blood pressure control.\par \par 4. Health maintenance issues discussed with patient. He agrees to do fecal occult blood in immunology

## 2018-12-07 NOTE — HEALTH RISK ASSESSMENT
[] : No [Any fall with injury in past year] : Patient reported fall with injury in the past year [0] : 2) Feeling down, depressed, or hopeless: Not at all (0) [[id number]] : 0

## 2018-12-13 LAB — HEMOCCULT STL QL IA: NEGATIVE

## 2018-12-17 ENCOUNTER — APPOINTMENT (OUTPATIENT)
Dept: ORTHOPEDIC SURGERY | Facility: CLINIC | Age: 68
End: 2018-12-17
Payer: MEDICARE

## 2018-12-17 VITALS
WEIGHT: 200 LBS | SYSTOLIC BLOOD PRESSURE: 121 MMHG | HEIGHT: 66 IN | DIASTOLIC BLOOD PRESSURE: 73 MMHG | BODY MASS INDEX: 32.14 KG/M2 | HEART RATE: 97 BPM

## 2018-12-17 DIAGNOSIS — M54.9 DORSALGIA, UNSPECIFIED: ICD-10-CM

## 2018-12-17 DIAGNOSIS — S39.012A STRAIN OF MUSCLE, FASCIA AND TENDON OF LOWER BACK, INITIAL ENCOUNTER: ICD-10-CM

## 2018-12-17 DIAGNOSIS — Z78.9 OTHER SPECIFIED HEALTH STATUS: ICD-10-CM

## 2018-12-17 DIAGNOSIS — S30.0XXA CONTUSION OF LOWER BACK AND PELVIS, INITIAL ENCOUNTER: ICD-10-CM

## 2018-12-17 DIAGNOSIS — M47.812 SPONDYLOSIS W/OUT MYELOPATHY OR RADICULOPATHY, CERVICAL REGION: ICD-10-CM

## 2018-12-17 PROCEDURE — 99204 OFFICE O/P NEW MOD 45 MIN: CPT

## 2019-01-08 ENCOUNTER — LABORATORY RESULT (OUTPATIENT)
Age: 69
End: 2019-01-08

## 2019-01-09 LAB
BASOPHILS # BLD AUTO: 0.67 K/UL
BASOPHILS NFR BLD AUTO: 5.2 %
EOSINOPHIL # BLD AUTO: 0.22 K/UL
EOSINOPHIL NFR BLD AUTO: 1.7 %
HCT VFR BLD CALC: 46.8 %
HGB BLD-MCNC: 15.2 G/DL
LYMPHOCYTES # BLD AUTO: 1.46 K/UL
LYMPHOCYTES NFR BLD AUTO: 11.3 %
MAN DIFF?: NORMAL
MCHC RBC-ENTMCNC: 32.5 GM/DL
MCHC RBC-ENTMCNC: 34.3 PG
MCV RBC AUTO: 105.6 FL
MONOCYTES # BLD AUTO: 0.45 K/UL
MONOCYTES NFR BLD AUTO: 3.5 %
NEUTROPHILS # BLD AUTO: 9.89 K/UL
NEUTROPHILS NFR BLD AUTO: 74.9 %
PLATELET # BLD AUTO: 544 K/UL
RBC # BLD: 4.43 M/UL
RBC # FLD: 15.6 %
WBC # FLD AUTO: 12.91 K/UL

## 2019-01-12 ENCOUNTER — APPOINTMENT (OUTPATIENT)
Dept: ORTHOPEDIC SURGERY | Facility: CLINIC | Age: 69
End: 2019-01-12
Payer: MEDICARE

## 2019-01-12 VITALS
SYSTOLIC BLOOD PRESSURE: 153 MMHG | DIASTOLIC BLOOD PRESSURE: 77 MMHG | HEART RATE: 76 BPM | BODY MASS INDEX: 32.14 KG/M2 | WEIGHT: 200 LBS | HEIGHT: 66 IN

## 2019-01-12 DIAGNOSIS — Z85.9 PERSONAL HISTORY OF MALIGNANT NEOPLASM, UNSPECIFIED: ICD-10-CM

## 2019-01-12 DIAGNOSIS — Z86.79 PERSONAL HISTORY OF OTHER DISEASES OF THE CIRCULATORY SYSTEM: ICD-10-CM

## 2019-01-12 PROCEDURE — 99214 OFFICE O/P EST MOD 30 MIN: CPT

## 2019-01-12 NOTE — HISTORY OF PRESENT ILLNESS
[Pain Location] : pain [] : right & left shoulder [C-Spine] : C-spine region [Stable] : stable [None] : No relieving factors are noted [de-identified] : The patient is a pleasant 68-year-old male referred by Dr. Osmany Syed for evaluation regarding neck pain and numbness and tingling to the dominant right upper extremity and hand. Patient has pain also radiates to both shoulders. Patient had a workup consisting of a CT of the cervical spine on November 6, 2018. He presents for a consultation. Past medical history is significant for a platelet bleeding disorder. The patient has a history of kidney and bladder cancer as well.

## 2019-01-12 NOTE — DISCUSSION/SUMMARY
[Medication Risks Reviewed] : Medication risks reviewed [Surgical risks reviewed] : Surgical risks reviewed [de-identified] : Given the patient's medical comorbidities and bleeding disorder was recommended he does not obtain the possibility of next surgical intervention. Other options include pain management which also cervical with a bleeding disorder. Patient does not feel that physical therapy would be helpful and I concur. The patient will continue to exercise and stretching on his own. He'll follow up with us as needed.

## 2019-01-12 NOTE — CONSULT LETTER
[Dear  ___] : Dear  [unfilled], [Consult Letter:] : I had the pleasure of evaluating your patient, [unfilled]. [FreeTextEntry1] : I hope this information will be helpful to you in the care of your patient.  If I may be of any further assistance, please do not hesitate to contact my office.\par \par Please see my consult note below.\par \par Sincerely,\par \par \par Sammy Taylor MD, FAAOS\par Chief, Division of Spine Surgery\par Department of Orthopaedic Surgery\par [facility] [___] : [unfilled]

## 2019-01-12 NOTE — PHYSICAL EXAM
[UE/LE] : Motor: 5/5 motor strength in bilateral upper & lower extremities [] : Sensory: [C4-RT] : C4 [C5-RT] : C5 [C6-RT] : C6 [Normal RUE] : Right Upper Extremity: No scars, rashes, lesions, ulcers, skin intact [Normal LUE] : Left Upper Extremity: No scars, rashes, lesions, ulcers, skin intact [Normal] : Oriented to person, place, and time, insight and judgement were intact and the affect was normal [de-identified] : MRI evaluation of the cervical spine obtained on November 20, 2018 reveals multilevel degenerative disease from C2-3 to C6-7. There is significant degeneration with foraminal stenosis noted at C4-5 and C5-6.

## 2019-03-05 ENCOUNTER — LABORATORY RESULT (OUTPATIENT)
Age: 69
End: 2019-03-05

## 2019-03-05 LAB
ALBUMIN SERPL ELPH-MCNC: 5.2 G/DL
ALP BLD-CCNC: 56 U/L
ALT SERPL-CCNC: 39 U/L
AST SERPL-CCNC: 35 U/L
BASOPHILS # BLD AUTO: 0.26 K/UL
BASOPHILS NFR BLD AUTO: 1.8 %
BILIRUB DIRECT SERPL-MCNC: 0.2 MG/DL
BILIRUB INDIRECT SERPL-MCNC: 0.6 MG/DL
BILIRUB SERPL-MCNC: 0.7 MG/DL
CREAT SERPL-MCNC: 1.28 MG/DL
EOSINOPHIL # BLD AUTO: 0.38 K/UL
EOSINOPHIL NFR BLD AUTO: 2.6 %
HCT VFR BLD CALC: 46.1 %
HGB BLD-MCNC: 15.1 G/DL
LYMPHOCYTES # BLD AUTO: 1.39 K/UL
LYMPHOCYTES NFR BLD AUTO: 9.6 %
MAN DIFF?: NORMAL
MCHC RBC-ENTMCNC: 32.8 GM/DL
MCHC RBC-ENTMCNC: 34.5 PG
MCV RBC AUTO: 105.3 FL
MONOCYTES # BLD AUTO: 0.89 K/UL
MONOCYTES NFR BLD AUTO: 6.1 %
NEUTROPHILS # BLD AUTO: 10.97 K/UL
NEUTROPHILS NFR BLD AUTO: 73.9 %
PLATELET # BLD AUTO: 499 K/UL
PROT SERPL-MCNC: 7.2 G/DL
RBC # BLD: 4.38 M/UL
RBC # FLD: 15.5 %
WBC # FLD AUTO: 14.51 K/UL

## 2019-03-19 ENCOUNTER — OUTPATIENT (OUTPATIENT)
Dept: OUTPATIENT SERVICES | Facility: HOSPITAL | Age: 69
LOS: 1 days | Discharge: HOME | End: 2019-03-19

## 2019-03-19 ENCOUNTER — APPOINTMENT (OUTPATIENT)
Dept: HEMATOLOGY ONCOLOGY | Facility: CLINIC | Age: 69
End: 2019-03-19
Payer: MEDICARE

## 2019-03-19 VITALS
DIASTOLIC BLOOD PRESSURE: 77 MMHG | OXYGEN SATURATION: 99 % | RESPIRATION RATE: 18 BRPM | WEIGHT: 208.98 LBS | HEART RATE: 67 BPM | BODY MASS INDEX: 33.73 KG/M2 | TEMPERATURE: 98.1 F | SYSTOLIC BLOOD PRESSURE: 155 MMHG

## 2019-03-19 DIAGNOSIS — D47.1 CHRONIC MYELOPROLIFERATIVE DISEASE: ICD-10-CM

## 2019-03-19 PROCEDURE — 99214 OFFICE O/P EST MOD 30 MIN: CPT

## 2019-03-19 NOTE — ASSESSMENT
[FreeTextEntry1] : MPD/thrombocytosis-most recent lab work available reviewed with patient. Patient to continue with current Hydrea dosing at this time. He also continues with Aspirin 81 mg daily-potential bleeding risk reviewed. No overt bleeding noted clinically at present.\par Patient was given the opportunity to ask questions. His questions have been answered at this time.

## 2019-03-19 NOTE — HISTORY OF PRESENT ILLNESS
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 Flow cytometry of the bone marrow aspirate lymphocytes showed no diagnostic abnormalities. Reticulin stain showed a slight increase and trichrome stain was negative for collagen (grade 1 fibrosis). Bone marrow aspirate iron stain showed iron stores present with ringed sideroblasts (less than 15% of cells). Quantitative BCR-ABL by RT-PCR negative. Cytogenetics showed a normal male karyotype. MDS FISH panel negative. Maintained on Hydrea therapy.\par \par History of renal cancer 10/2014-stage I(pT1a), status post right partial nephrectomy\par \par History of bladder cancer (noninvasive) 1994- status post TURBT. [de-identified] : Overall, feeling well without new complaints.\par Continues in followup at [facility] [place] for history of bladder/renal cancers every 6 months-goes back again 6/2019.\par No complaints of headache, pruritus, chest pain, shortness of breath, abdominal pain. No hematuria.\par

## 2019-03-19 NOTE — PHYSICAL EXAM
[Fully active, able to carry on all pre-disease performance without restriction] : Status 0 - Fully active, able to carry on all pre-disease performance without restriction [Normal] : full range of motion and no deformities appreciated [de-identified] : non-toxic appearing [de-identified] : soft, NT without palpable hepatosplenomegaly:+ ventral hernia-soft, NT

## 2019-05-09 ENCOUNTER — MEDICATION RENEWAL (OUTPATIENT)
Age: 69
End: 2019-05-09

## 2019-06-13 ENCOUNTER — APPOINTMENT (OUTPATIENT)
Dept: NEPHROLOGY | Facility: CLINIC | Age: 69
End: 2019-06-13
Payer: MEDICARE

## 2019-06-13 VITALS
HEIGHT: 66 IN | HEART RATE: 86 BPM | BODY MASS INDEX: 33.11 KG/M2 | DIASTOLIC BLOOD PRESSURE: 82 MMHG | OXYGEN SATURATION: 98 % | SYSTOLIC BLOOD PRESSURE: 125 MMHG | WEIGHT: 206 LBS

## 2019-06-13 LAB
ALBUMIN SERPL ELPH-MCNC: 4.8 G/DL
ANION GAP SERPL CALC-SCNC: 13 MMOL/L
BUN SERPL-MCNC: 21 MG/DL
CALCIUM SERPL-MCNC: 9.9 MG/DL
CHLORIDE SERPL-SCNC: 101 MMOL/L
CO2 SERPL-SCNC: 28 MMOL/L
CREAT SERPL-MCNC: 1.37 MG/DL
GLUCOSE SERPL-MCNC: 114 MG/DL
PHOSPHATE SERPL-MCNC: 3.1 MG/DL
POTASSIUM SERPL-SCNC: 5.3 MMOL/L
SODIUM SERPL-SCNC: 142 MMOL/L

## 2019-06-13 PROCEDURE — 99214 OFFICE O/P EST MOD 30 MIN: CPT

## 2019-06-13 RX ORDER — MELOXICAM 15 MG/1
TABLET ORAL
Refills: 0 | Status: DISCONTINUED | COMMUNITY
End: 2019-06-13

## 2019-06-13 NOTE — ASSESSMENT
[FreeTextEntry1] : 68 years old man with mild CKD here for follow up evaluation.\par \par Chronic Kidney Disease Stage III -- Secondary to hypertension and partial nephrectomy.  Stable overall and somewhat improved from two years ago.  Continue to stay well hydrated.  Avoid NSAIDs - discussed cessation of mobic with the patient.  Latest creatinine 1.37.  Stable.  Follow up in one year.  He had a follow up CT scan for his cancer which showed a very small indeterminant lesion.  He will follow this with his surgeon.\par \par Hypertension -- The patient's blood pressure is at goal and he seems to be tolerating the present regimen.  Will continue.

## 2019-06-13 NOTE — REVIEW OF SYSTEMS
[Fever] : no fever [Chills] : no chills [Feeling Poorly] : not feeling poorly [Feeling Tired] : not feeling tired [Nosebleeds] : no nosebleeds [Eyesight Problems] : no eyesight problems [Nasal Discharge] : no nasal discharge [Chest Pain] : no chest pain [Palpitations] : no palpitations [Leg Claudication] : no intermittent leg claudication [Lower Ext Edema] : no extremity edema [Abdominal Pain] : no abdominal pain [Shortness Of Breath] : no shortness of breath [As Noted in HPI] : as noted in HPI [Vomiting] : no vomiting [Joint Pain] : no joint pain [Arthralgias] : no arthralgias [Joint Swelling] : no joint swelling [Dizziness] : no dizziness [Joint Stiffness] : no joint stiffness [Easy Bleeding] : no tendency for easy bleeding [Fainting] : no fainting [Easy Bruising] : no tendency for easy bruising [de-identified] : pain and weakness in his hands associated with his cervical spinal disease

## 2019-06-13 NOTE — HISTORY OF PRESENT ILLNESS
[FreeTextEntry1] : Today I had the pleasure of meeting Drew Morales he is a 68 years old man who was born and raised in [place].  He used to live near [facility] and is a big car and baseball fan.  He now is retired and lives out on [address].  He has had a prior history of partial nephrectomy and hypertension for which he followed with Dr. Ortega.  On presentation today he feels great.  He has no complaints.  He is not SOB he has no NVD.  Urinating is ok.  Sometimes he has a little incontinence but he does not have a sensation of incomplete voiding.  He has recently had some cervical spinal issues and was taking mobic but his creatinine and bp have been stable.

## 2019-06-13 NOTE — PHYSICAL EXAM
[General Appearance - In No Acute Distress] : in no acute distress [General Appearance - Alert] : alert [Sclera] : the sclera and conjunctiva were normal [General Appearance - Well Nourished] : well nourished [Hearing Threshold Finger Rub Not [place]] : hearing was normal [Oropharynx] : the oropharynx was normal [Neck Appearance] : the appearance of the neck was normal [Neck Cervical Mass (___cm)] : no neck mass was observed [Jugular Venous Distention Increased] : there was no jugular-venous distention [Respiration, Rhythm And Depth] : normal respiratory rhythm and effort [Exaggerated Use Of Accessory Muscles For Inspiration] : no accessory muscle use [Auscultation Breath Sounds / Voice Sounds] : lungs were clear to auscultation bilaterally [Heart Sounds Gallop] : no gallops [Heart Sounds] : normal S1 and S2 [Murmurs] : no murmurs [Heart Sounds Pericardial Friction Rub] : no pericardial rub [Edema] : there was no peripheral edema [Abdomen Tenderness] : non-tender [Abdomen Soft] : soft [Abdomen Mass (___ Cm)] : no abdominal mass palpated [] : no rash [FreeTextEntry1] : abdomen wrapped to contain hernias [Impaired Insight] : insight and judgment were intact [Oriented To Time, Place, And Person] : oriented to person, place, and time [Affect] : the affect was normal [Mood] : the mood was normal

## 2019-06-19 ENCOUNTER — RX RENEWAL (OUTPATIENT)
Age: 69
End: 2019-06-19

## 2019-06-20 ENCOUNTER — APPOINTMENT (OUTPATIENT)
Dept: FAMILY MEDICINE | Facility: CLINIC | Age: 69
End: 2019-06-20
Payer: MEDICARE

## 2019-06-20 VITALS
OXYGEN SATURATION: 92 % | TEMPERATURE: 98 F | SYSTOLIC BLOOD PRESSURE: 162 MMHG | HEIGHT: 66 IN | BODY MASS INDEX: 33.27 KG/M2 | HEART RATE: 88 BPM | DIASTOLIC BLOOD PRESSURE: 84 MMHG | WEIGHT: 207 LBS

## 2019-06-20 VITALS — DIASTOLIC BLOOD PRESSURE: 80 MMHG | SYSTOLIC BLOOD PRESSURE: 140 MMHG

## 2019-06-20 PROCEDURE — 99214 OFFICE O/P EST MOD 30 MIN: CPT

## 2019-06-20 NOTE — REVIEW OF SYSTEMS
[Joint Pain] : joint pain [Joint Stiffness] : joint stiffness [Muscle Pain] : muscle pain [Back Pain] : back pain [Negative] : Heme/Lymph [Joint Swelling] : no joint swelling

## 2019-06-20 NOTE — PHYSICAL EXAM
[Well Nourished] : well nourished [No Acute Distress] : no acute distress [Well Developed] : well developed [Well-Appearing] : well-appearing [Normal Sclera/Conjunctiva] : normal sclera/conjunctiva [EOMI] : extraocular movements intact [PERRL] : pupils equal round and reactive to light [Normal Outer Ear/Nose] : the outer ears and nose were normal in appearance [No JVD] : no jugular venous distention [Normal Oropharynx] : the oropharynx was normal [No Lymphadenopathy] : no lymphadenopathy [Thyroid Normal, No Nodules] : the thyroid was normal and there were no nodules present [Clear to Auscultation] : lungs were clear to auscultation bilaterally [No Respiratory Distress] : no respiratory distress  [No Accessory Muscle Use] : no accessory muscle use [Normal Rate] : normal rate  [Normal S1, S2] : normal S1 and S2 [Regular Rhythm] : with a regular rhythm [No Murmur] : no murmur heard [No Carotid Bruits] : no carotid bruits [No Abdominal Bruit] : a ~M bruit was not heard ~T in the abdomen [No Varicosities] : no varicosities [Pedal Pulses Present] : the pedal pulses are present [No Edema] : there was no peripheral edema [No Extremity Clubbing/Cyanosis] : no extremity clubbing/cyanosis [Soft] : abdomen soft [No Palpable Aorta] : no palpable aorta [Non-distended] : non-distended [Non Tender] : non-tender [No HSM] : no HSM [No Masses] : no abdominal mass palpated [Normal Posterior Cervical Nodes] : no posterior cervical lymphadenopathy [Normal Bowel Sounds] : normal bowel sounds [No CVA Tenderness] : no CVA  tenderness [Normal Anterior Cervical Nodes] : no anterior cervical lymphadenopathy [No Spinal Tenderness] : no spinal tenderness [No Joint Swelling] : no joint swelling [No Rash] : no rash [Grossly Normal Strength/Tone] : grossly normal strength/tone [Normal Gait] : normal gait [No Focal Deficits] : no focal deficits [Coordination Grossly Intact] : coordination grossly intact [Speech Grossly Normal] : speech grossly normal [Deep Tendon Reflexes (DTR)] : deep tendon reflexes were 2+ and symmetric [Normal Affect] : the affect was normal [Memory Grossly Normal] : memory grossly normal [Normal Mood] : the mood was normal [Normal Insight/Judgement] : insight and judgment were intact [de-identified] : spasm [de-identified] : upper back spasm

## 2019-06-20 NOTE — ASSESSMENT
[FreeTextEntry1] : Assessment and plan:\par \par 1. Status post fall. Residual back and neck pain.Issue has been diagnosed with spinal stenosis unfortunately he's been prohibited by nephrology to take pain medications, my recommendations are Tylenol since it is not metabolized by the kidney, muscle relaxant, and heat alternating with ice since the patient states it affords him some relief.\par \par 2. Anxiety well controlled with Klonopin 1 mg twice a day as needed.\par \par 3. Hypertension. Continue clonidine 0.1 mg twice a day. Okay. Blood pressure control.\par \par 4. Health maintenance issues discussed with patient. No change in present medical management except for discontinuing pain medications.

## 2019-06-20 NOTE — COUNSELING
[Sleep ___ hours/day] : Sleep [unfilled] hours/day [Behavioral health counseling provided] : behavioral health  [Fall prevention counseling provided] : fall prevention  [Engage in a relaxing activity] : Engage in a relaxing activity [Plan in advance] : Plan in advance [Adequate lighting] : Adequate lighting [Use proper foot wear] : Use proper foot wear [No throw rugs] : No throw rugs [None] : None [Good understanding] : Patient has a good understanding of lifestyle changes and the steps needed to achieve self management goals

## 2019-06-20 NOTE — HISTORY OF PRESENT ILLNESS
[FreeTextEntry1] : Severe neck pain, presently 5/10 as high as 8/10 [de-identified] : Patient is 68-year-old gentleman, who presents today for followup appointment. Patient is complaining of chronic neck pain recently diagnosed with cervical spinal stenosis. He is followed very closely by spine surgeon, Dr. Sammy Dumas patient also has history of chronic renal insufficiency, hypertension, myeloproliferative disorder patient also has history of renal cell carcinoma, which is followed very closely by [facility].\par \par Patient is awake, alert, and oriented x3, visibly in pain unable to rotate head due to severe cervical neck pain. Patient rates the pain presently as chronically 5/10, but goes as high as 8/10. Patient unfortunately had to stop his pain medications secondary to renal insufficiency.

## 2019-07-01 ENCOUNTER — INBOUND DOCUMENT (OUTPATIENT)
Age: 69
End: 2019-07-01

## 2019-07-03 ENCOUNTER — TRANSCRIPTION ENCOUNTER (OUTPATIENT)
Age: 69
End: 2019-07-03

## 2019-07-08 ENCOUNTER — LABORATORY RESULT (OUTPATIENT)
Age: 69
End: 2019-07-08

## 2019-07-12 ENCOUNTER — OUTPATIENT (OUTPATIENT)
Dept: OUTPATIENT SERVICES | Facility: HOSPITAL | Age: 69
LOS: 1 days | Discharge: HOME | End: 2019-07-12

## 2019-07-12 DIAGNOSIS — D47.1 CHRONIC MYELOPROLIFERATIVE DISEASE: ICD-10-CM

## 2019-07-17 ENCOUNTER — APPOINTMENT (OUTPATIENT)
Dept: HEMATOLOGY ONCOLOGY | Facility: CLINIC | Age: 69
End: 2019-07-17
Payer: MEDICARE

## 2019-07-17 VITALS
BODY MASS INDEX: 32.88 KG/M2 | HEART RATE: 76 BPM | RESPIRATION RATE: 16 BRPM | WEIGHT: 203.69 LBS | TEMPERATURE: 98.8 F | OXYGEN SATURATION: 97 % | DIASTOLIC BLOOD PRESSURE: 76 MMHG | SYSTOLIC BLOOD PRESSURE: 120 MMHG

## 2019-07-17 PROCEDURE — 99214 OFFICE O/P EST MOD 30 MIN: CPT

## 2019-07-17 NOTE — PHYSICAL EXAM
[Normal] : affect appropriate [de-identified] : non-toxic appearing [de-identified] : no JVD [de-identified] : soft, NT without palpable hepatosplenomegaly:+ ventral hernia-soft, NT

## 2019-07-17 NOTE — ASSESSMENT
[FreeTextEntry1] : Myeloproliferative disorder/thrombocytosis-lab work reviewed with patient. Patient will continue with current Hydrea dosing (potential side effect profile reviewed). He also remains on aspirin 81 mg daily.\par Slightly elevated SGOT discussed-short interval repeat level to be done. If persistently elevated LFT(s), to consider hepatic imaging.\par Patient continues under urologic care at [facility] [place] for his  malignancy.\par Patient was given the opportunity to ask questions. His questions have been answered to his satisfaction at this time.

## 2019-07-17 NOTE — HISTORY OF PRESENT ILLNESS
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 Flow cytometry of the bone marrow aspirate lymphocytes showed no diagnostic abnormalities. Reticulin stain showed a slight increase and trichrome stain was negative for collagen (grade 1 fibrosis). Bone marrow aspirate iron stain showed iron stores present with ringed sideroblasts (less than 15% of cells). Quantitative BCR-ABL by RT-PCR negative. Cytogenetics showed a normal male karyotype. MDS FISH panel negative. Maintained on Hydrea therapy.\par \par History of renal cancer 10/2014-stage I(pT1a), status post right partial nephrectomy\par \par History of bladder cancer (noninvasive) 1994- status post TURBT. [de-identified] : Told has cervical spinal stenosis, but is not a surgical candidate per Dr. Taylor.\par Has been following up at [facility] [place] for history of bladder/renal cancers every 6 months-goes back in another 3 months-recently found small bladder tumors.\par No complaints of headache, pruritus, chest pain, shortness of breath, abdominal/pelvic pain. No hematuria.\par

## 2019-08-01 ENCOUNTER — LABORATORY RESULT (OUTPATIENT)
Age: 69
End: 2019-08-01

## 2019-08-23 ENCOUNTER — RX RENEWAL (OUTPATIENT)
Age: 69
End: 2019-08-23

## 2019-09-09 ENCOUNTER — LABORATORY RESULT (OUTPATIENT)
Age: 69
End: 2019-09-09

## 2019-09-10 LAB
BASOPHILS # BLD AUTO: 0 K/UL
BASOPHILS NFR BLD AUTO: 0 %
EOSINOPHIL # BLD AUTO: 0.16 K/UL
EOSINOPHIL NFR BLD AUTO: 1 %
HCT VFR BLD CALC: 47.2 %
HGB BLD-MCNC: 15.3 G/DL
LYMPHOCYTES # BLD AUTO: 1.6 K/UL
LYMPHOCYTES NFR BLD AUTO: 10 %
MAN DIFF?: NORMAL
MCHC RBC-ENTMCNC: 32.4 GM/DL
MCHC RBC-ENTMCNC: 33.3 PG
MCV RBC AUTO: 102.8 FL
MONOCYTES # BLD AUTO: 0.96 K/UL
MONOCYTES NFR BLD AUTO: 6 %
NEUTROPHILS # BLD AUTO: 12.47 K/UL
NEUTROPHILS NFR BLD AUTO: 75 %
PLATELET # BLD AUTO: 575 K/UL
RBC # BLD: 4.59 M/UL
RBC # FLD: 15.7 %
WBC # FLD AUTO: 15.99 K/UL

## 2019-09-20 ENCOUNTER — APPOINTMENT (OUTPATIENT)
Dept: FAMILY MEDICINE | Facility: CLINIC | Age: 69
End: 2019-09-20
Payer: MEDICARE

## 2019-09-20 VITALS
HEART RATE: 70 BPM | BODY MASS INDEX: 32.78 KG/M2 | SYSTOLIC BLOOD PRESSURE: 144 MMHG | WEIGHT: 204 LBS | RESPIRATION RATE: 16 BRPM | OXYGEN SATURATION: 98 % | DIASTOLIC BLOOD PRESSURE: 88 MMHG | HEIGHT: 66 IN

## 2019-09-20 DIAGNOSIS — M48.02 SPINAL STENOSIS, CERVICAL REGION: ICD-10-CM

## 2019-09-20 PROCEDURE — 99214 OFFICE O/P EST MOD 30 MIN: CPT

## 2019-09-20 NOTE — HEALTH RISK ASSESSMENT
[No falls in past year] : Patient reported no falls in the past year [No] : No [0] : 1) Little interest or pleasure doing things: Not at all (0) [] : No [[id number]] : 0 [Audit-CScore] : 0

## 2019-09-20 NOTE — COUNSELING
[Fall prevention counseling provided] : Fall prevention counseling provided [Adequate lighting] : Adequate lighting [No throw rugs] : No throw rugs [Use proper foot wear] : Use proper foot wear [Behavioral health counseling provided] : Behavioral health counseling provided [Sleep ___ hours/day] : Sleep [unfilled] hours/day [Plan in advance] : Plan in advance [Engage in a relaxing activity] : Engage in a relaxing activity [AUDIT-C Screening administered and reviewed] : AUDIT-C Screening administered and reviewed [Good understanding] : Patient has a good understanding of lifestyle changes and steps needed to achieve self management goal [None] : None

## 2019-09-20 NOTE — HISTORY OF PRESENT ILLNESS
[FreeTextEntry1] : Severe neck pain [de-identified] : Patient is 68-year-old gentleman presents today for followup. Patient states, that he's been in his usual state of health, unfortunately, has severe neck and shoulder pain. Patient has severe cervical stenosis of the spine with cervical spondylosis. He is followed by orthopedic surgery. He is not a candidate for surgery and unfortunately, due to to the myeloproliferative disorder that the patient has nonsteroidal and anti-inflammatory drugs are not the best medications to use. Patient is complaining of severe pain unable to turn his neck. Unable to bend appropriately pain is consistently present.\par \par Medical history is significant for generalized anxiety, cervical stenosis, chronic renal insufficiency, stage III, which actually has improved. Hypertension myeloproliferative disorder, and now severe neck and shoulder pain.

## 2019-09-20 NOTE — ASSESSMENT
[FreeTextEntry1] : Assessment and plan\par \par 1. Severe neck pain. Range of motion is decreased. Patient cannot turn head I recommend attempting tramadol 50 mg p.o. every 6 hours only as needed. Detailed discussion with patient regarding possible side effects. The first time that the patient takes medications. I want him to be at home and he will notify me if he develops any side effects.\par \par 2. Hypertension, elevated today secondary to the severe pain that the patient has I will continue present medical management with clonidine 0.1 mg twice a day.\par \par 3. Generalized anxiety continue Klonopin 1 mg twice a day as needed.\par \par 4. Health maintenance issues discussed with patient patient is not a candidate for influenza vaccines are pneumonia vaccines. The reason being is that the patient is on chemotherapy secondary to myeloproliferative disorder.\par \par 5. Face-to-face with patient 30 minutes 50% of that time was spent on coordination of care and counseling regarding pain management. Patient will be following up also with orthopedics.

## 2019-09-20 NOTE — PHYSICAL EXAM
[Well Nourished] : well nourished [No Acute Distress] : no acute distress [Well Developed] : well developed [Well-Appearing] : well-appearing [Normal Sclera/Conjunctiva] : normal sclera/conjunctiva [PERRL] : pupils equal round and reactive to light [EOMI] : extraocular movements intact [Normal Outer Ear/Nose] : the outer ears and nose were normal in appearance [No JVD] : no jugular venous distention [Normal Oropharynx] : the oropharynx was normal [No Lymphadenopathy] : no lymphadenopathy [Thyroid Normal, No Nodules] : the thyroid was normal and there were no nodules present [No Accessory Muscle Use] : no accessory muscle use [No Respiratory Distress] : no respiratory distress  [Normal Rate] : normal rate  [Clear to Auscultation] : lungs were clear to auscultation bilaterally [Regular Rhythm] : with a regular rhythm [Normal S1, S2] : normal S1 and S2 [No Murmur] : no murmur heard [No Carotid Bruits] : no carotid bruits [No Abdominal Bruit] : a ~M bruit was not heard ~T in the abdomen [No Varicosities] : no varicosities [Pedal Pulses Present] : the pedal pulses are present [No Edema] : there was no peripheral edema [No Palpable Aorta] : no palpable aorta [No Extremity Clubbing/Cyanosis] : no extremity clubbing/cyanosis [Soft] : abdomen soft [Non Tender] : non-tender [Non-distended] : non-distended [No Masses] : no abdominal mass palpated [No HSM] : no HSM [Normal Bowel Sounds] : normal bowel sounds [Normal Posterior Cervical Nodes] : no posterior cervical lymphadenopathy [Normal Anterior Cervical Nodes] : no anterior cervical lymphadenopathy [No CVA Tenderness] : no CVA  tenderness [No Spinal Tenderness] : no spinal tenderness [No Joint Swelling] : no joint swelling [Grossly Normal Strength/Tone] : grossly normal strength/tone [No Rash] : no rash [Coordination Grossly Intact] : coordination grossly intact [No Focal Deficits] : no focal deficits [Normal Gait] : normal gait [Deep Tendon Reflexes (DTR)] : deep tendon reflexes were 2+ and symmetric [Normal Affect] : the affect was normal [Normal Insight/Judgement] : insight and judgment were intact [de-identified] : Range of motion decreased secondary to pain. Patient cannot touch chin to chest wall because of severe pain

## 2019-10-02 ENCOUNTER — MEDICATION RENEWAL (OUTPATIENT)
Age: 69
End: 2019-10-02

## 2019-11-04 ENCOUNTER — LABORATORY RESULT (OUTPATIENT)
Age: 69
End: 2019-11-04

## 2019-11-04 LAB
ALBUMIN SERPL ELPH-MCNC: 5.1 G/DL
ALP BLD-CCNC: 56 U/L
ALT SERPL-CCNC: 32 U/L
AST SERPL-CCNC: 24 U/L
BASOPHILS # BLD AUTO: 0.6 K/UL
BASOPHILS NFR BLD AUTO: 4.3 %
BILIRUB DIRECT SERPL-MCNC: 0.2 MG/DL
BILIRUB INDIRECT SERPL-MCNC: 0.5 MG/DL
BILIRUB SERPL-MCNC: 0.7 MG/DL
CREAT SERPL-MCNC: 1.3 MG/DL
EOSINOPHIL # BLD AUTO: 0.49 K/UL
EOSINOPHIL NFR BLD AUTO: 3.5 %
HCT VFR BLD CALC: 47.5 %
HGB BLD-MCNC: 15.6 G/DL
LYMPHOCYTES # BLD AUTO: 1.44 K/UL
LYMPHOCYTES NFR BLD AUTO: 10.3 %
MAN DIFF?: NORMAL
MCHC RBC-ENTMCNC: 32.8 GM/DL
MCHC RBC-ENTMCNC: 34.2 PG
MCV RBC AUTO: 104.2 FL
MONOCYTES # BLD AUTO: 0.6 K/UL
MONOCYTES NFR BLD AUTO: 4.3 %
NEUTROPHILS # BLD AUTO: 10.86 K/UL
NEUTROPHILS NFR BLD AUTO: 73.3 %
PLATELET # BLD AUTO: 535 K/UL
PROT SERPL-MCNC: 7.2 G/DL
RBC # BLD: 4.56 M/UL
RBC # FLD: 15.7 %
WBC # FLD AUTO: 13.99 K/UL

## 2019-11-14 ENCOUNTER — OUTPATIENT (OUTPATIENT)
Dept: OUTPATIENT SERVICES | Facility: HOSPITAL | Age: 69
LOS: 1 days | Discharge: HOME | End: 2019-11-14

## 2019-11-14 DIAGNOSIS — D47.1 CHRONIC MYELOPROLIFERATIVE DISEASE: ICD-10-CM

## 2019-11-21 ENCOUNTER — APPOINTMENT (OUTPATIENT)
Dept: HEMATOLOGY ONCOLOGY | Facility: CLINIC | Age: 69
End: 2019-11-21
Payer: MEDICARE

## 2019-11-21 VITALS
TEMPERATURE: 98.1 F | BODY MASS INDEX: 32.63 KG/M2 | WEIGHT: 202.16 LBS | OXYGEN SATURATION: 99 % | RESPIRATION RATE: 16 BRPM | HEART RATE: 75 BPM | DIASTOLIC BLOOD PRESSURE: 74 MMHG | SYSTOLIC BLOOD PRESSURE: 165 MMHG

## 2019-11-21 PROCEDURE — 99214 OFFICE O/P EST MOD 30 MIN: CPT

## 2019-11-21 NOTE — HISTORY OF PRESENT ILLNESS
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 Flow cytometry of the bone marrow aspirate lymphocytes showed no diagnostic abnormalities. Reticulin stain showed a slight increase and trichrome stain was negative for collagen (grade 1 fibrosis). Bone marrow aspirate iron stain showed iron stores present with ringed sideroblasts (less than 15% of cells). Quantitative BCR-ABL by RT-PCR negative. Cytogenetics showed a normal male karyotype. MDS FISH panel negative. Maintained on Hydrea therapy.\par \par History of renal cancer 10/2014-stage I(pT1a), status post right partial nephrectomy\par \par History of bladder cancer (noninvasive) 1994- status post TURBT. [de-identified] : Has cervical stenosis-has been trying pain medications per PCP; told not a surgical candidate per Dr. Taylor.\par Went to [facility] [place] 1 month ago-stated bladder and kidney evaluations were "good."\par No complaints of headache, pruritus, chest pain, shortness of breath, abdominal/pelvic pain. No c/o hematuria.\par

## 2019-11-21 NOTE — ASSESSMENT
[FreeTextEntry1] : Myeloproliferative disorder/thrombocytosis-lab work reviewed with patient. Clinically stable at present. Patient will continue with current Hydrea dosing (potential side effect profile reviewed). He also remains on aspirin 81 mg daily. No overt bleeding noted clinically at present.\par Patient continues under urologic care at [facility] [place] for his  malignancy.\par Patient was given the opportunity to ask questions. His questions have been answered to his satisfaction at this time.

## 2019-11-21 NOTE — PHYSICAL EXAM
[Normal] : affect appropriate [de-identified] : non-toxic appearing [de-identified] : no JVD [de-identified] : soft, NT without palpable hepatosplenomegaly:+ ventral hernia-soft, NT

## 2020-01-07 ENCOUNTER — LABORATORY RESULT (OUTPATIENT)
Age: 70
End: 2020-01-07

## 2020-01-08 LAB
BASOPHILS # BLD AUTO: 0.56 K/UL
BASOPHILS NFR BLD AUTO: 3.5 %
EOSINOPHIL # BLD AUTO: 0.41 K/UL
EOSINOPHIL NFR BLD AUTO: 2.6 %
HCT VFR BLD CALC: 44.8 %
HGB BLD-MCNC: 14.5 G/DL
LYMPHOCYTES # BLD AUTO: 1.53 K/UL
LYMPHOCYTES NFR BLD AUTO: 9.6 %
MAN DIFF?: NORMAL
MCHC RBC-ENTMCNC: 32.4 GM/DL
MCHC RBC-ENTMCNC: 34.4 PG
MCV RBC AUTO: 106.4 FL
MONOCYTES # BLD AUTO: 0.41 K/UL
MONOCYTES NFR BLD AUTO: 2.6 %
NEUTROPHILS # BLD AUTO: 12.45 K/UL
NEUTROPHILS NFR BLD AUTO: 78.1 %
PLATELET # BLD AUTO: 476 K/UL
RBC # BLD: 4.21 M/UL
RBC # FLD: 15.2 %
WBC # FLD AUTO: 15.94 K/UL

## 2020-03-02 ENCOUNTER — LABORATORY RESULT (OUTPATIENT)
Age: 70
End: 2020-03-02

## 2020-03-02 LAB
ALBUMIN SERPL ELPH-MCNC: 5.4 G/DL
ALP BLD-CCNC: 68 U/L
ALT SERPL-CCNC: 33 U/L
AST SERPL-CCNC: 29 U/L
BASOPHILS # BLD AUTO: 0 K/UL
BASOPHILS NFR BLD AUTO: 0 %
BILIRUB DIRECT SERPL-MCNC: 0.2 MG/DL
BILIRUB INDIRECT SERPL-MCNC: 0.4 MG/DL
BILIRUB SERPL-MCNC: 0.6 MG/DL
CREAT SERPL-MCNC: 1.22 MG/DL
EOSINOPHIL # BLD AUTO: 0.33 K/UL
EOSINOPHIL NFR BLD AUTO: 1.7 %
HCT VFR BLD CALC: 48 %
HGB BLD-MCNC: 15.3 G/DL
LYMPHOCYTES # BLD AUTO: 1.36 K/UL
LYMPHOCYTES NFR BLD AUTO: 7 %
MAN DIFF?: NORMAL
MCHC RBC-ENTMCNC: 31.9 GM/DL
MCHC RBC-ENTMCNC: 32.8 PG
MCV RBC AUTO: 103 FL
MONOCYTES # BLD AUTO: 0.5 K/UL
MONOCYTES NFR BLD AUTO: 2.6 %
NEUTROPHILS # BLD AUTO: 15.19 K/UL
NEUTROPHILS NFR BLD AUTO: 77.4 %
PLATELET # BLD AUTO: 545 K/UL
PROT SERPL-MCNC: 7.4 G/DL
RBC # BLD: 4.66 M/UL
RBC # FLD: 15.3 %
WBC # FLD AUTO: 19.4 K/UL

## 2020-03-14 ENCOUNTER — OUTPATIENT (OUTPATIENT)
Dept: OUTPATIENT SERVICES | Facility: HOSPITAL | Age: 70
LOS: 1 days | Discharge: HOME | End: 2020-03-14

## 2020-03-14 DIAGNOSIS — D47.1 CHRONIC MYELOPROLIFERATIVE DISEASE: ICD-10-CM

## 2020-03-16 ENCOUNTER — RX RENEWAL (OUTPATIENT)
Age: 70
End: 2020-03-16

## 2020-03-19 ENCOUNTER — APPOINTMENT (OUTPATIENT)
Dept: HEMATOLOGY ONCOLOGY | Facility: CLINIC | Age: 70
End: 2020-03-19

## 2020-03-20 ENCOUNTER — APPOINTMENT (OUTPATIENT)
Dept: FAMILY MEDICINE | Facility: CLINIC | Age: 70
End: 2020-03-20

## 2020-05-27 ENCOUNTER — LABORATORY RESULT (OUTPATIENT)
Age: 70
End: 2020-05-27

## 2020-05-27 LAB
ALBUMIN SERPL ELPH-MCNC: 5.2 G/DL
ALP BLD-CCNC: 52 U/L
ALT SERPL-CCNC: 27 U/L
AST SERPL-CCNC: 26 U/L
BASOPHILS # BLD AUTO: 0.62 K/UL
BASOPHILS NFR BLD AUTO: 3.5 %
BILIRUB DIRECT SERPL-MCNC: 0.2 MG/DL
BILIRUB INDIRECT SERPL-MCNC: 0.6 MG/DL
BILIRUB SERPL-MCNC: 0.8 MG/DL
CREAT SERPL-MCNC: 1.41 MG/DL
EOSINOPHIL # BLD AUTO: 0.46 K/UL
EOSINOPHIL NFR BLD AUTO: 2.6 %
HCT VFR BLD CALC: 47.2 %
HGB BLD-MCNC: 15.2 G/DL
LYMPHOCYTES # BLD AUTO: 1.24 K/UL
LYMPHOCYTES NFR BLD AUTO: 7 %
MAN DIFF?: NORMAL
MCHC RBC-ENTMCNC: 32.2 GM/DL
MCHC RBC-ENTMCNC: 33.6 PG
MCV RBC AUTO: 104.4 FL
MONOCYTES # BLD AUTO: 0.78 K/UL
MONOCYTES NFR BLD AUTO: 4.4 %
NEUTROPHILS # BLD AUTO: 14.44 K/UL
NEUTROPHILS NFR BLD AUTO: 81.6 %
PLATELET # BLD AUTO: 561 K/UL
PROT SERPL-MCNC: 7.1 G/DL
RBC # BLD: 4.52 M/UL
RBC # FLD: 15.5 %
WBC # FLD AUTO: 17.69 K/UL

## 2020-06-10 ENCOUNTER — OUTPATIENT (OUTPATIENT)
Dept: OUTPATIENT SERVICES | Facility: HOSPITAL | Age: 70
LOS: 1 days | Discharge: HOME | End: 2020-06-10

## 2020-06-10 DIAGNOSIS — D47.1 CHRONIC MYELOPROLIFERATIVE DISEASE: ICD-10-CM

## 2020-06-11 ENCOUNTER — APPOINTMENT (OUTPATIENT)
Dept: NEPHROLOGY | Facility: CLINIC | Age: 70
End: 2020-06-11

## 2020-06-11 ENCOUNTER — RX RENEWAL (OUTPATIENT)
Age: 70
End: 2020-06-11

## 2020-06-15 ENCOUNTER — RX RENEWAL (OUTPATIENT)
Age: 70
End: 2020-06-15

## 2020-06-16 ENCOUNTER — APPOINTMENT (OUTPATIENT)
Dept: HEMATOLOGY ONCOLOGY | Facility: CLINIC | Age: 70
End: 2020-06-16
Payer: MEDICARE

## 2020-06-16 PROCEDURE — 99213 OFFICE O/P EST LOW 20 MIN: CPT | Mod: 95

## 2020-06-16 NOTE — PHYSICAL EXAM
[Fully active, able to carry on all pre-disease performance without restriction] : Status 0 - Fully active, able to carry on all pre-disease performance without restriction [Normal] : affect appropriate [de-identified] : non-toxic appearing [de-identified] : breathing appeared unlabored [de-identified] : coloration appeared normal

## 2020-06-16 NOTE — HISTORY OF PRESENT ILLNESS
[Home] : at home, [unfilled] , at the time of the visit. 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 Flow cytometry of the bone marrow aspirate lymphocytes showed no diagnostic abnormalities. Reticulin stain showed a slight increase and trichrome stain was negative for collagen (grade 1 fibrosis). Bone marrow aspirate iron stain showed iron stores present with ringed sideroblasts (less than 15% of cells). Quantitative BCR-ABL by RT-PCR negative. Cytogenetics showed a normal male karyotype. MDS FISH panel negative. Maintained on Hydrea therapy.\par \par History of renal cancer 10/2014-stage I(pT1a), status post right partial nephrectomy\par \par History of bladder cancer (noninvasive) 1994- status post TURBT. [de-identified] : Telehealth/[employer] video visit due to COVID-19 pandemic.\par Saw Dr. Valadez last week in  f/u. Had cystoscopy and found multiple bladder lesions-->removed. Scheduled for CT urogram later this month. No c/o bleeding or fevers.\par Exercises per Dr. Taylor have helped with neck pain. No longer on pain medication.\par No complaints of headache, pruritus, chest pain, shortness of breath, abdominal/pelvic pain. No cough.\par Plans to f/u with nephrologist Dr. Calderon as soon as can reschedule appt. (was postponed secondary to COVID concerns).\par

## 2020-06-16 NOTE — CONSULT LETTER
[Courtesy Letter:] : I had the pleasure of seeing your patient, [unfilled], in my office today. [Dear  ___] : Dear  [unfilled], [Consult Closing:] : Thank you very much for allowing me to participate in the care of this patient.  If you have any questions, please do not hesitate to contact me. [Please see my note below.] : Please see my note below. [Sincerely,] : Sincerely, [FreeTextEntry3] : Katie Neil MD

## 2020-06-16 NOTE — ASSESSMENT
[FreeTextEntry1] : Myeloproliferative disorder/thrombocytosis-lab work reviewed with patient. Clinically stable at present. Patient will continue with current Hydrea dosing. He also remains on aspirin 81 mg daily. No overt bleeding reported at present.\par Patient continues under urologic care at [facility] [place] for his  malignancy.  He will follow-up with nephrologist for his kidney disease as well.\par Patient was given the opportunity to ask questions. His questions have been answered to his apparent satisfaction at this time.  He is agreeable to recommended follow-up.

## 2020-08-19 ENCOUNTER — LABORATORY RESULT (OUTPATIENT)
Age: 70
End: 2020-08-19

## 2020-08-19 LAB
ALBUMIN SERPL ELPH-MCNC: 5.1 G/DL
ALP BLD-CCNC: 51 U/L
ALT SERPL-CCNC: 29 U/L
AST SERPL-CCNC: 29 U/L
BASOPHILS # BLD AUTO: 0.83 K/UL
BASOPHILS NFR BLD AUTO: 4.3 %
BILIRUB DIRECT SERPL-MCNC: 0.1 MG/DL
BILIRUB INDIRECT SERPL-MCNC: 0.5 MG/DL
BILIRUB SERPL-MCNC: 0.7 MG/DL
CREAT SERPL-MCNC: 1.25 MG/DL
EOSINOPHIL # BLD AUTO: 0.33 K/UL
EOSINOPHIL NFR BLD AUTO: 1.7 %
HCT VFR BLD CALC: 48.5 %
HGB BLD-MCNC: 15.1 G/DL
LYMPHOCYTES # BLD AUTO: 1.68 K/UL
LYMPHOCYTES NFR BLD AUTO: 8.7 %
MAN DIFF?: NORMAL
MCHC RBC-ENTMCNC: 31.1 GM/DL
MCHC RBC-ENTMCNC: 33 PG
MCV RBC AUTO: 106.1 FL
MONOCYTES # BLD AUTO: 1.35 K/UL
MONOCYTES NFR BLD AUTO: 7 %
NEUTROPHILS # BLD AUTO: 14.29 K/UL
NEUTROPHILS NFR BLD AUTO: 73.9 %
PLATELET # BLD AUTO: 503 K/UL
PROT SERPL-MCNC: 7.1 G/DL
RBC # BLD: 4.57 M/UL
RBC # FLD: 15.9 %
WBC # FLD AUTO: 19.34 K/UL

## 2020-09-25 ENCOUNTER — RX RENEWAL (OUTPATIENT)
Age: 70
End: 2020-09-25

## 2020-09-30 ENCOUNTER — OUTPATIENT (OUTPATIENT)
Dept: OUTPATIENT SERVICES | Facility: HOSPITAL | Age: 70
LOS: 1 days | Discharge: HOME | End: 2020-09-30

## 2020-09-30 DIAGNOSIS — D47.1 CHRONIC MYELOPROLIFERATIVE DISEASE: ICD-10-CM

## 2020-10-01 ENCOUNTER — LABORATORY RESULT (OUTPATIENT)
Age: 70
End: 2020-10-01

## 2020-10-05 ENCOUNTER — APPOINTMENT (OUTPATIENT)
Dept: FAMILY MEDICINE | Facility: CLINIC | Age: 70
End: 2020-10-05

## 2020-10-07 ENCOUNTER — APPOINTMENT (OUTPATIENT)
Dept: HEMATOLOGY ONCOLOGY | Facility: CLINIC | Age: 70
End: 2020-10-07
Payer: MEDICARE

## 2020-10-07 VITALS
OXYGEN SATURATION: 99 % | BODY MASS INDEX: 32.02 KG/M2 | TEMPERATURE: 97.8 F | SYSTOLIC BLOOD PRESSURE: 143 MMHG | RESPIRATION RATE: 16 BRPM | HEART RATE: 71 BPM | DIASTOLIC BLOOD PRESSURE: 77 MMHG | WEIGHT: 198.39 LBS

## 2020-10-07 PROCEDURE — 99213 OFFICE O/P EST LOW 20 MIN: CPT

## 2020-10-07 NOTE — ASSESSMENT
[FreeTextEntry1] : Myeloproliferative disorder with thrombocytosis/leukocytosis-lab work reviewed with patient. Clinically stable at present. Patient will continue with current Hydrea dosing. He also remains on aspirin 81 mg daily. No overt bleeding reported currently.\par Patient continues under urologic care at [facility] [place] for his  malignancy.  He is to continue follow-up with nephrologist for his kidney disease as well.\par Patient was given the opportunity to ask questions. His questions have been answered to his apparent satisfaction at this time.  He is agreeable to recommended follow-up.

## 2020-10-07 NOTE — PHYSICAL EXAM
[Fully active, able to carry on all pre-disease performance without restriction] : Status 0 - Fully active, able to carry on all pre-disease performance without restriction [Normal] : affect appropriate [de-identified] : non-toxic appearing

## 2020-10-07 NOTE — HISTORY OF PRESENT ILLNESS
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 Flow cytometry of the bone marrow aspirate lymphocytes showed no diagnostic abnormalities. Reticulin stain showed a slight increase and trichrome stain was negative for collagen (grade 1 fibrosis). Bone marrow aspirate iron stain showed iron stores present with ringed sideroblasts (less than 15% of cells). Quantitative BCR-ABL by RT-PCR negative. Cytogenetics showed a normal male karyotype. MDS FISH panel negative. Maintained on Hydrea therapy.\par \par History of renal cancer 10/2014-stage I(pT1a), status post right partial nephrectomy\par \par History of bladder cancer (noninvasive) 1994- status post TURBT. [de-identified] : CT urogram just done [facility]-told fatty liver (not new), splenomegaly. Had cystoscopy with good results. No c/o dysuria/hematuria. No fevers.\par No complaints of headache, pruritus, chest pain, shortness of breath, abdominal/pelvic pain. No cough.\par \par

## 2020-10-15 ENCOUNTER — TRANSCRIPTION ENCOUNTER (OUTPATIENT)
Age: 70
End: 2020-10-15

## 2020-10-19 ENCOUNTER — APPOINTMENT (OUTPATIENT)
Dept: NEPHROLOGY | Facility: CLINIC | Age: 70
End: 2020-10-19

## 2020-10-29 ENCOUNTER — APPOINTMENT (OUTPATIENT)
Dept: FAMILY MEDICINE | Facility: CLINIC | Age: 70
End: 2020-10-29
Payer: MEDICARE

## 2020-10-29 VITALS
HEART RATE: 82 BPM | DIASTOLIC BLOOD PRESSURE: 74 MMHG | SYSTOLIC BLOOD PRESSURE: 122 MMHG | OXYGEN SATURATION: 99 % | WEIGHT: 195 LBS | BODY MASS INDEX: 31.34 KG/M2 | TEMPERATURE: 98.2 F | HEIGHT: 66 IN | RESPIRATION RATE: 16 BRPM

## 2020-10-29 DIAGNOSIS — M25.519 CERVICALGIA: ICD-10-CM

## 2020-10-29 DIAGNOSIS — M54.2 CERVICALGIA: ICD-10-CM

## 2020-10-29 PROCEDURE — 99214 OFFICE O/P EST MOD 30 MIN: CPT

## 2020-10-29 NOTE — HISTORY OF PRESENT ILLNESS
[FreeTextEntry1] : See HPI [de-identified] : \par \par Medical history is significant for generalized anxiety, cervical stenosis, chronic renal insufficiency, stage III, which actually has improved. Hypertension myeloproliferative disorder.  Neck pain basically has resolved patient doing extremely well.  Recently seen by hematology oncology.

## 2020-10-29 NOTE — PHYSICAL EXAM
[No Acute Distress] : no acute distress [Well Nourished] : well nourished [Well Developed] : well developed [Well-Appearing] : well-appearing [Normal Sclera/Conjunctiva] : normal sclera/conjunctiva [PERRL] : pupils equal round and reactive to light [EOMI] : extraocular movements intact [Normal Outer Ear/Nose] : the outer ears and nose were normal in appearance [Normal TMs] : both tympanic membranes were normal [No JVD] : no jugular venous distention [No Lymphadenopathy] : no lymphadenopathy [Thyroid Normal, No Nodules] : the thyroid was normal and there were no nodules present [No Respiratory Distress] : no respiratory distress  [No Accessory Muscle Use] : no accessory muscle use [Clear to Auscultation] : lungs were clear to auscultation bilaterally [Normal Rate] : normal rate  [Regular Rhythm] : with a regular rhythm [Normal S1, S2] : normal S1 and S2 [No Murmur] : no murmur heard [No Carotid Bruits] : no carotid bruits [No Abdominal Bruit] : a ~M bruit was not heard ~T in the abdomen [No Varicosities] : no varicosities [Pedal Pulses Present] : the pedal pulses are present [No Edema] : there was no peripheral edema [No Palpable Aorta] : no palpable aorta [No Extremity Clubbing/Cyanosis] : no extremity clubbing/cyanosis [Soft] : abdomen soft [Non Tender] : non-tender [Non-distended] : non-distended [No Masses] : no abdominal mass palpated [No HSM] : no HSM [Normal Bowel Sounds] : normal bowel sounds [Normal Posterior Cervical Nodes] : no posterior cervical lymphadenopathy [Normal Anterior Cervical Nodes] : no anterior cervical lymphadenopathy [No CVA Tenderness] : no CVA  tenderness [No Spinal Tenderness] : no spinal tenderness [No Joint Swelling] : no joint swelling [Grossly Normal Strength/Tone] : grossly normal strength/tone [No Rash] : no rash [Coordination Grossly Intact] : coordination grossly intact [No Focal Deficits] : no focal deficits [Normal Gait] : normal gait [Deep Tendon Reflexes (DTR)] : deep tendon reflexes were 2+ and symmetric [Normal Affect] : the affect was normal [Normal Insight/Judgement] : insight and judgment were intact

## 2020-10-29 NOTE — HEALTH RISK ASSESSMENT
[] : No [No] : In the past 12 months have you used drugs other than those required for medical reasons? No [No falls in past year] : Patient reported no falls in the past year [0] : 2) Feeling down, depressed, or hopeless: Not at all (0) [Audit-CScore] : 0 [[id number]] : 0

## 2020-10-29 NOTE — COUNSELING
[Fall prevention counseling provided] : Fall prevention counseling provided [Adequate lighting] : Adequate lighting [No throw rugs] : No throw rugs [Use proper foot wear] : Use proper foot wear [Behavioral health counseling provided] : Behavioral health counseling provided [Sleep ___ hours/day] : Sleep [unfilled] hours/day [Engage in a relaxing activity] : Engage in a relaxing activity [Plan in advance] : Plan in advance [AUDIT-C Screening administered and reviewed] : AUDIT-C Screening administered and reviewed [Potential consequences of obesity discussed] : Potential consequences of obesity discussed [Benefits of weight loss discussed] : Benefits of weight loss discussed [Structured Weight Management Program suggested:] : Structured weight management program suggested [Encouraged to maintain food diary] : Encouraged to maintain food diary [Encouraged to increase physical activity] : Encouraged to increase physical activity [Weigh Self Weekly] : weigh self weekly [Decrease Portions] : decrease portions [____ min/wk Activity] : [unfilled] min/wk activity [Keep Food Diary] : keep food diary [None] : None [Good understanding] : Patient has a good understanding of lifestyle changes and steps needed to achieve self management goal

## 2020-10-29 NOTE — ASSESSMENT
[FreeTextEntry1] : Assessment and plan:\par \par 1.  Neck pain has resolved with exercise.\par \par 2.  Hypertension excellent blood pressure control with clonidine Benicar was discontinued.  Patient has stage III renal insufficiency followed by nephrology.\par \par 3.  Generalized anxiety disorder continue Klonopin 1 mg every 12 hours as needed I stop checked there is no sign of abuse continue present medical management without change\par \par 4.  Myeloproliferative syndrome, leukocytosis, thrombocytosis I reviewed with patient his most recent blood work I also reviewed with patient hematology oncology most recent evaluation.\par \par 5.  Patient is not a candidate for vaccinations due to his underlying pathology.\par \par 6.  Continue present medical management without change.

## 2020-12-04 ENCOUNTER — LABORATORY RESULT (OUTPATIENT)
Age: 70
End: 2020-12-04

## 2020-12-04 LAB — PSA SERPL-MCNC: 1.22 NG/ML

## 2020-12-07 LAB
BASOPHILS # BLD AUTO: 0 K/UL
BASOPHILS NFR BLD AUTO: 0 %
EOSINOPHIL # BLD AUTO: 0.32 K/UL
EOSINOPHIL NFR BLD AUTO: 1.7 %
HCT VFR BLD CALC: 49 %
HGB BLD-MCNC: 15.4 G/DL
LYMPHOCYTES # BLD AUTO: 0.81 K/UL
LYMPHOCYTES NFR BLD AUTO: 4.3 %
MAN DIFF?: NORMAL
MCHC RBC-ENTMCNC: 31.4 GM/DL
MCHC RBC-ENTMCNC: 32.6 PG
MCV RBC AUTO: 103.8 FL
MONOCYTES # BLD AUTO: 0.49 K/UL
MONOCYTES NFR BLD AUTO: 2.6 %
NEUTROPHILS # BLD AUTO: 15.72 K/UL
NEUTROPHILS NFR BLD AUTO: 83.6 %
PLATELET # BLD AUTO: 478 K/UL
RBC # BLD: 4.72 M/UL
RBC # FLD: 16.1 %
WBC # FLD AUTO: 18.8 K/UL

## 2020-12-16 PROBLEM — Z87.09 HISTORY OF UPPER RESPIRATORY INFECTION: Status: RESOLVED | Noted: 2018-05-31 | Resolved: 2020-12-16

## 2020-12-24 ENCOUNTER — RX RENEWAL (OUTPATIENT)
Age: 70
End: 2020-12-24

## 2021-01-30 ENCOUNTER — OUTPATIENT (OUTPATIENT)
Dept: OUTPATIENT SERVICES | Facility: HOSPITAL | Age: 71
LOS: 1 days | Discharge: HOME | End: 2021-01-30

## 2021-01-30 DIAGNOSIS — D47.1 CHRONIC MYELOPROLIFERATIVE DISEASE: ICD-10-CM

## 2021-02-04 ENCOUNTER — LABORATORY RESULT (OUTPATIENT)
Age: 71
End: 2021-02-04

## 2021-02-04 LAB
ALBUMIN SERPL ELPH-MCNC: 4.8 G/DL
ALP BLD-CCNC: 59 U/L
ALT SERPL-CCNC: 28 U/L
AST SERPL-CCNC: 29 U/L
BILIRUB DIRECT SERPL-MCNC: 0.2 MG/DL
BILIRUB INDIRECT SERPL-MCNC: 0.5 MG/DL
BILIRUB SERPL-MCNC: 0.6 MG/DL
CREAT SERPL-MCNC: 1.28 MG/DL
PROT SERPL-MCNC: 6.9 G/DL

## 2021-02-05 LAB
BASOPHILS # BLD AUTO: 0.57 K/UL
BASOPHILS NFR BLD AUTO: 3 %
EOSINOPHIL # BLD AUTO: 0.57 K/UL
EOSINOPHIL NFR BLD AUTO: 3 %
HCT VFR BLD CALC: 48.2 %
HGB BLD-MCNC: 15.2 G/DL
LYMPHOCYTES # BLD AUTO: 1.33 K/UL
LYMPHOCYTES NFR BLD AUTO: 7 %
MAN DIFF?: NORMAL
MCHC RBC-ENTMCNC: 31.5 GM/DL
MCHC RBC-ENTMCNC: 33 PG
MCV RBC AUTO: 104.8 FL
MONOCYTES # BLD AUTO: 0.95 K/UL
MONOCYTES NFR BLD AUTO: 5 %
NEUTROPHILS # BLD AUTO: 14.49 K/UL
NEUTROPHILS NFR BLD AUTO: 76 %
PLATELET # BLD AUTO: 513 K/UL
RBC # BLD: 4.6 M/UL
RBC # FLD: 16 %
WBC # FLD AUTO: 19.06 K/UL

## 2021-02-17 ENCOUNTER — APPOINTMENT (OUTPATIENT)
Dept: HEMATOLOGY ONCOLOGY | Facility: CLINIC | Age: 71
End: 2021-02-17
Payer: MEDICARE

## 2021-02-17 VITALS
RESPIRATION RATE: 18 BRPM | WEIGHT: 201.06 LBS | DIASTOLIC BLOOD PRESSURE: 79 MMHG | OXYGEN SATURATION: 98 % | TEMPERATURE: 97.3 F | HEART RATE: 98 BPM | BODY MASS INDEX: 31.19 KG/M2 | HEIGHT: 67.13 IN | SYSTOLIC BLOOD PRESSURE: 175 MMHG

## 2021-02-17 PROCEDURE — 99214 OFFICE O/P EST MOD 30 MIN: CPT

## 2021-02-17 RX ORDER — SULFAMETHOXAZOLE AND TRIMETHOPRIM 800; 160 MG/1; MG/1
800-160 TABLET ORAL
Qty: 6 | Refills: 0 | Status: DISCONTINUED | COMMUNITY
Start: 2020-06-09 | End: 2021-02-17

## 2021-02-17 NOTE — CONSULT LETTER
[Dear  ___] : Dear  [unfilled], [Courtesy Letter:] : I had the pleasure of seeing your patient, [unfilled], in my office today. [Please see my note below.] : Please see my note below. [Consult Closing:] : Thank you very much for allowing me to participate in the care of this patient.  If you have any questions, please do not hesitate to contact me. [Sincerely,] : Sincerely, [FreeTextEntry3] : Katie Fernandez MD

## 2021-02-17 NOTE — HISTORY OF PRESENT ILLNESS
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 Flow cytometry of the bone marrow aspirate lymphocytes showed no diagnostic abnormalities. Reticulin stain showed a slight increase and trichrome stain was negative for collagen (grade 1 fibrosis). Bone marrow aspirate iron stain showed iron stores present with ringed sideroblasts (less than 15% of cells). Quantitative BCR-ABL by RT-PCR negative. Cytogenetics showed a normal male karyotype. MDS FISH panel negative. Maintained on Hydrea therapy.\par \par History of renal cancer 10/2014-stage I(pT1a), status post right partial nephrectomy\par \par History of bladder cancer (noninvasive) 1994- status post TURBT. [de-identified] : S/P resection of bladder tumors 2 weeks ago. Had 3 days of hematuria-last episode was yesterday. Has 4 month  f/u planned [facility].\par No fevers or dysuria or c/o back pain.\par No complaints of headache, pruritus, chest pain, shortness of breath, abdominal/pelvic pain. No cough.\par \par

## 2021-02-17 NOTE — ASSESSMENT
[FreeTextEntry1] : Myeloproliferative disorder with thrombocytosis/leukocytosis-lab work reviewed with patient. Clinically stable at present. Patient will continue with current Hydrea dosing. He also remains on aspirin 81 mg daily.\par \par Patient continues under urologic care at [facility] [place] for his  malignancy.  He is to continue follow-up with nephrologist for his kidney disease as well.\par \par Patient to f/u with PCP for ongoing BP management. He will take his second dose of clonidine today.\par \par Patient was given the opportunity to ask questions. His questions have been answered to his apparent satisfaction at this time.  He is agreeable to recommended follow-up.

## 2021-02-17 NOTE — PHYSICAL EXAM
[Fully active, able to carry on all pre-disease performance without restriction] : Status 0 - Fully active, able to carry on all pre-disease performance without restriction [Normal] : affect appropriate [de-identified] : non-toxic appearing

## 2021-04-02 ENCOUNTER — NON-APPOINTMENT (OUTPATIENT)
Age: 71
End: 2021-04-02

## 2021-04-05 ENCOUNTER — LABORATORY RESULT (OUTPATIENT)
Age: 71
End: 2021-04-05

## 2021-04-05 LAB
BASOPHILS # BLD AUTO: 0.19 K/UL
BASOPHILS NFR BLD AUTO: 0.9 %
EOSINOPHIL # BLD AUTO: 0.73 K/UL
EOSINOPHIL NFR BLD AUTO: 3.5 %
HCT VFR BLD CALC: 46.6 %
HGB BLD-MCNC: 14.6 G/DL
LYMPHOCYTES # BLD AUTO: 2.16 K/UL
LYMPHOCYTES NFR BLD AUTO: 10.4 %
MAN DIFF?: NORMAL
MCHC RBC-ENTMCNC: 31.3 GM/DL
MCHC RBC-ENTMCNC: 32.4 PG
MCV RBC AUTO: 103.3 FL
MONOCYTES # BLD AUTO: 0.89 K/UL
MONOCYTES NFR BLD AUTO: 4.3 %
NEUTROPHILS # BLD AUTO: 15.86 K/UL
NEUTROPHILS NFR BLD AUTO: 76.5 %
PLATELET # BLD AUTO: 507 K/UL
RBC # BLD: 4.51 M/UL
RBC # FLD: 15.6 %
WBC # FLD AUTO: 20.73 K/UL

## 2021-04-26 ENCOUNTER — APPOINTMENT (OUTPATIENT)
Dept: FAMILY MEDICINE | Facility: CLINIC | Age: 71
End: 2021-04-26
Payer: MEDICARE

## 2021-04-26 VITALS
WEIGHT: 202 LBS | TEMPERATURE: 98.1 F | HEART RATE: 92 BPM | HEIGHT: 67 IN | SYSTOLIC BLOOD PRESSURE: 168 MMHG | DIASTOLIC BLOOD PRESSURE: 83 MMHG | BODY MASS INDEX: 31.71 KG/M2 | RESPIRATION RATE: 16 BRPM | OXYGEN SATURATION: 96 %

## 2021-04-26 PROCEDURE — 99214 OFFICE O/P EST MOD 30 MIN: CPT

## 2021-04-26 NOTE — PLAN
[FreeTextEntry1] : Assessment and plan:\par \par Cardiac risk index class II, procedure itself is low risk there is no medical contraindication for the proposed procedure patient is medically stable for cataract surgery.

## 2021-04-26 NOTE — COUNSELING
[Weight management counseling provided] : Weight management [Healthy eating counseling provided] : healthy eating [Activity counseling provided] : activity [Behavioral health counseling provided] : behavioral health  [Fall prevention counseling provided] : fall prevention  [Good understanding] : Patient has a good understanding of disease, goals and obesity follow-up plan [Target Wt Loss Goal ___] : Target weight loss goal [unfilled] lbs [Weigh Self Once Weekly] : Weigh self once weekly [Low Fat Diet] : Low fat diet [Low Salt Diet] : Low salt diet [Decrease Portions] : Decrease food portions [Keep Food Diary] : Keep food diary [___ min/wk activity recommended] : [unfilled] min/wk activity recommended [Walking] : Walking [Sleep ___ hours/day] : Sleep [unfilled] hours/day [Engage in a relaxing activity] : Engage in a relaxing activity [Plan in advance] : Plan in advance [Adequate lighting] : Adequate lighting [No throw rugs] : No throw rugs [Use proper foot wear] : Use proper foot wear [None] : None

## 2021-04-26 NOTE — PHYSICAL EXAM
[No Acute Distress] : no acute distress [Well Nourished] : well nourished [Well Developed] : well developed [Well-Appearing] : well-appearing [Normal Voice/Communication] : normal voice/communication [Normal Sclera/Conjunctiva] : normal sclera/conjunctiva [PERRL] : pupils equal round and reactive to light [EOMI] : extraocular movements intact [Normal Outer Ear/Nose] : the outer ears and nose were normal in appearance [Normal TMs] : both tympanic membranes were normal [No JVD] : no jugular venous distention [No Lymphadenopathy] : no lymphadenopathy [Thyroid Normal, No Nodules] : the thyroid was normal and there were no nodules present [No Respiratory Distress] : no respiratory distress  [No Accessory Muscle Use] : no accessory muscle use [Clear to Auscultation] : lungs were clear to auscultation bilaterally [Normal Rate] : normal rate  [Regular Rhythm] : with a regular rhythm [Normal S1, S2] : normal S1 and S2 [No Murmur] : no murmur heard [No Carotid Bruits] : no carotid bruits [No Abdominal Bruit] : a ~M bruit was not heard ~T in the abdomen [No Varicosities] : no varicosities [Pedal Pulses Present] : the pedal pulses are present [No Edema] : there was no peripheral edema [No Palpable Aorta] : no palpable aorta [No Extremity Clubbing/Cyanosis] : no extremity clubbing/cyanosis [Soft] : abdomen soft [Non Tender] : non-tender [Non-distended] : non-distended [No Masses] : no abdominal mass palpated [No HSM] : no HSM [Normal Bowel Sounds] : normal bowel sounds [Normal Posterior Cervical Nodes] : no posterior cervical lymphadenopathy [Normal Anterior Cervical Nodes] : no anterior cervical lymphadenopathy [No CVA Tenderness] : no CVA  tenderness [No Spinal Tenderness] : no spinal tenderness [No Joint Swelling] : no joint swelling [Grossly Normal Strength/Tone] : grossly normal strength/tone [No Rash] : no rash [Coordination Grossly Intact] : coordination grossly intact [No Focal Deficits] : no focal deficits [Normal Gait] : normal gait [Deep Tendon Reflexes (DTR)] : deep tendon reflexes were 2+ and symmetric [Speech Grossly Normal] : speech grossly normal [Memory Grossly Normal] : memory grossly normal [Normal Affect] : the affect was normal [Alert and Oriented x3] : oriented to person, place, and time [Normal Mood] : the mood was normal [Normal Insight/Judgement] : insight and judgment were intact

## 2021-04-26 NOTE — HISTORY OF PRESENT ILLNESS
[No Pertinent Cardiac History] : no history of aortic stenosis, atrial fibrillation, coronary artery disease, recent myocardial infarction, or implantable device/pacemaker [No Pertinent Pulmonary History] : no history of asthma, COPD, sleep apnea, or smoking [No Adverse Anesthesia Reaction] : no adverse anesthesia reaction in self or family member [Chronic Kidney Disease] : chronic kidney disease [(Patient denies any chest pain, claudication, dyspnea on exertion, orthopnea, palpitations or syncope)] : Patient denies any chest pain, claudication, dyspnea on exertion, orthopnea, palpitations or syncope [Aortic Stenosis] : no aortic stenosis [Atrial Fibrillation] : no atrial fibrillation [Coronary Artery Disease] : no coronary artery disease [Recent Myocardial Infarction] : no recent myocardial infarction [Implantable Device/Pacemaker] : no implantable device/pacemaker [Asthma] : no asthma [COPD] : no COPD [Sleep Apnea] : no sleep apnea [Smoker] : not a smoker [Family Member] : no family member with adverse anesthesia reaction/sudden death [Self] : no previous adverse anesthesia reaction [Chronic Anticoagulation] : no chronic anticoagulation [Diabetes] : no diabetes [FreeTextEntry1] : Left cataract [FreeTextEntry2] : May 5, 2021 [FreeTextEntry3] : Dr. Jadon Randolph [FreeTextEntry4] : Patient is a 70-year-old gentleman who presents today for preoperative clearance patient will be having left cataract surgery.  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  Patient is followed on a regular basis by urology.

## 2021-05-03 ENCOUNTER — OUTPATIENT (OUTPATIENT)
Dept: OUTPATIENT SERVICES | Facility: HOSPITAL | Age: 71
LOS: 1 days | End: 2021-05-03
Payer: MEDICARE

## 2021-05-03 DIAGNOSIS — Z20.828 CONTACT WITH AND (SUSPECTED) EXPOSURE TO OTHER VIRAL COMMUNICABLE DISEASES: ICD-10-CM

## 2021-05-03 LAB — SARS-COV-2 RNA SPEC QL NAA+PROBE: SIGNIFICANT CHANGE UP

## 2021-05-03 PROCEDURE — U0005: CPT

## 2021-05-03 PROCEDURE — U0003: CPT

## 2021-05-04 ENCOUNTER — TRANSCRIPTION ENCOUNTER (OUTPATIENT)
Age: 71
End: 2021-05-04

## 2021-05-04 ENCOUNTER — NON-APPOINTMENT (OUTPATIENT)
Age: 71
End: 2021-05-04

## 2021-05-05 ENCOUNTER — OUTPATIENT (OUTPATIENT)
Dept: OUTPATIENT SERVICES | Facility: HOSPITAL | Age: 71
LOS: 1 days | End: 2021-05-05
Payer: MEDICARE

## 2021-05-05 VITALS
DIASTOLIC BLOOD PRESSURE: 77 MMHG | TEMPERATURE: 98 F | HEART RATE: 83 BPM | RESPIRATION RATE: 16 BRPM | SYSTOLIC BLOOD PRESSURE: 133 MMHG | OXYGEN SATURATION: 97 %

## 2021-05-05 VITALS
SYSTOLIC BLOOD PRESSURE: 154 MMHG | WEIGHT: 201.94 LBS | TEMPERATURE: 98 F | HEIGHT: 67 IN | HEART RATE: 88 BPM | DIASTOLIC BLOOD PRESSURE: 78 MMHG | RESPIRATION RATE: 16 BRPM | OXYGEN SATURATION: 97 %

## 2021-05-05 DIAGNOSIS — H25.12 AGE-RELATED NUCLEAR CATARACT, LEFT EYE: ICD-10-CM

## 2021-05-05 DIAGNOSIS — Z85.51 PERSONAL HISTORY OF MALIGNANT NEOPLASM OF BLADDER: Chronic | ICD-10-CM

## 2021-05-05 DIAGNOSIS — Z98.890 OTHER SPECIFIED POSTPROCEDURAL STATES: Chronic | ICD-10-CM

## 2021-05-05 PROCEDURE — 66984 XCAPSL CTRC RMVL W/O ECP: CPT | Mod: LT

## 2021-05-05 PROCEDURE — V2632: CPT

## 2021-05-05 RX ORDER — TRAMADOL HYDROCHLORIDE 50 MG/1
1 TABLET ORAL
Qty: 0 | Refills: 0 | DISCHARGE

## 2021-05-05 RX ORDER — TROPICAMIDE 1 %
1 DROPS OPHTHALMIC (EYE)
Refills: 0 | Status: COMPLETED | OUTPATIENT
Start: 2021-05-05 | End: 2021-05-05

## 2021-05-05 RX ORDER — CHOLECALCIFEROL (VITAMIN D3) 125 MCG
0 CAPSULE ORAL
Qty: 0 | Refills: 0 | DISCHARGE

## 2021-05-05 RX ORDER — KETOROLAC TROMETHAMINE 0.5 %
1 DROPS OPHTHALMIC (EYE)
Refills: 0 | Status: COMPLETED | OUTPATIENT
Start: 2021-05-05 | End: 2021-05-05

## 2021-05-05 RX ORDER — ASCORBIC ACID 60 MG
1 TABLET,CHEWABLE ORAL
Qty: 0 | Refills: 0 | DISCHARGE

## 2021-05-05 RX ORDER — TAPENTADOL HYDROCHLORIDE 50 MG/1
0 TABLET, FILM COATED ORAL
Qty: 0 | Refills: 0 | DISCHARGE

## 2021-05-05 RX ORDER — CYCLOPENTOLATE HYDROCHLORIDE 10 MG/ML
1 SOLUTION/ DROPS OPHTHALMIC
Refills: 0 | Status: COMPLETED | OUTPATIENT
Start: 2021-05-05 | End: 2021-05-05

## 2021-05-05 RX ORDER — ACETAMINOPHEN 500 MG
650 TABLET ORAL ONCE
Refills: 0 | Status: DISCONTINUED | OUTPATIENT
Start: 2021-05-05 | End: 2021-05-19

## 2021-05-05 RX ORDER — PHENYLEPHRINE HCL 2.5 %
1 DROPS OPHTHALMIC (EYE)
Refills: 0 | Status: COMPLETED | OUTPATIENT
Start: 2021-05-05 | End: 2021-05-05

## 2021-05-05 RX ORDER — SODIUM CHLORIDE 9 MG/ML
1000 INJECTION, SOLUTION INTRAVENOUS
Refills: 0 | Status: DISCONTINUED | OUTPATIENT
Start: 2021-05-05 | End: 2021-05-05

## 2021-05-05 RX ADMIN — Medication 1 DROP(S): at 10:32

## 2021-05-05 RX ADMIN — Medication 1 DROP(S): at 10:31

## 2021-05-05 RX ADMIN — CYCLOPENTOLATE HYDROCHLORIDE 1 DROP(S): 10 SOLUTION/ DROPS OPHTHALMIC at 10:32

## 2021-05-05 RX ADMIN — SODIUM CHLORIDE 40 MILLILITER(S): 9 INJECTION, SOLUTION INTRAVENOUS at 10:53

## 2021-05-05 RX ADMIN — Medication 1 DROP(S): at 10:42

## 2021-05-05 RX ADMIN — CYCLOPENTOLATE HYDROCHLORIDE 1 DROP(S): 10 SOLUTION/ DROPS OPHTHALMIC at 10:43

## 2021-05-05 RX ADMIN — Medication 1 DROP(S): at 10:43

## 2021-05-05 RX ADMIN — CYCLOPENTOLATE HYDROCHLORIDE 1 DROP(S): 10 SOLUTION/ DROPS OPHTHALMIC at 10:37

## 2021-05-05 RX ADMIN — Medication 1 DROP(S): at 10:38

## 2021-05-05 RX ADMIN — Medication 1 DROP(S): at 10:39

## 2021-05-05 RX ADMIN — Medication 1 DROP(S): at 10:37

## 2021-05-05 RX ADMIN — Medication 1 DROP(S): at 10:33

## 2021-05-05 NOTE — ASU DISCHARGE PLAN (ADULT/PEDIATRIC) - ASU DC SPECIAL INSTRUCTIONSFT
Keep shield on eye until office visit today at 2:15 pm  Any problems call office at [phone]    OFFICE VISIT TODAY AT 2:15 pm  Bring Drops

## 2021-05-05 NOTE — BRIEF OPERATIVE NOTE - NSICDXBRIEFPROCEDURE_GEN_ALL_CORE_FT
PROCEDURES:  Single stage extracapsular removal of cataract with insertion of intraocular lens prosthesis by phacoemulsification 05-May-2021 12:11:33  Jadon Randolph

## 2021-05-05 NOTE — ASU DISCHARGE PLAN (ADULT/PEDIATRIC) - NURSING INSTRUCTIONS
All discharge information reviewed with patient including pain, safety, medication and follow up care.

## 2021-05-27 PROBLEM — N17.9 ACUTE KIDNEY FAILURE, UNSPECIFIED: Chronic | Status: ACTIVE | Noted: 2021-05-04

## 2021-05-27 PROBLEM — C64.9 MALIGNANT NEOPLASM OF UNSPECIFIED KIDNEY, EXCEPT RENAL PELVIS: Chronic | Status: ACTIVE | Noted: 2021-05-04

## 2021-05-27 PROBLEM — I10 ESSENTIAL (PRIMARY) HYPERTENSION: Chronic | Status: ACTIVE | Noted: 2021-05-04

## 2021-05-27 PROBLEM — J30.1 ALLERGIC RHINITIS DUE TO POLLEN: Chronic | Status: ACTIVE | Noted: 2021-05-04

## 2021-06-08 ENCOUNTER — LABORATORY RESULT (OUTPATIENT)
Age: 71
End: 2021-06-08

## 2021-06-08 LAB
ALBUMIN SERPL ELPH-MCNC: 4.8 G/DL
ALP BLD-CCNC: 55 U/L
ALT SERPL-CCNC: 23 U/L
AST SERPL-CCNC: 22 U/L
BASOPHILS # BLD AUTO: 0.29 K/UL
BASOPHILS NFR BLD AUTO: 1.8 %
BILIRUB DIRECT SERPL-MCNC: 0.2 MG/DL
BILIRUB INDIRECT SERPL-MCNC: 0.4 MG/DL
BILIRUB SERPL-MCNC: 0.6 MG/DL
CREAT SERPL-MCNC: 1.26 MG/DL
EOSINOPHIL # BLD AUTO: 0 K/UL
EOSINOPHIL NFR BLD AUTO: 0 %
HCT VFR BLD CALC: 45.5 %
HGB BLD-MCNC: 14.7 G/DL
LYMPHOCYTES # BLD AUTO: 1.42 K/UL
LYMPHOCYTES NFR BLD AUTO: 8.8 %
MAN DIFF?: NORMAL
MCHC RBC-ENTMCNC: 32.3 GM/DL
MCHC RBC-ENTMCNC: 33.4 PG
MCV RBC AUTO: 103.4 FL
MONOCYTES # BLD AUTO: 0.56 K/UL
MONOCYTES NFR BLD AUTO: 3.5 %
NEUTROPHILS # BLD AUTO: 13.43 K/UL
NEUTROPHILS NFR BLD AUTO: 83.3 %
PLATELET # BLD AUTO: 493 K/UL
PROT SERPL-MCNC: 6.7 G/DL
RBC # BLD: 4.4 M/UL
RBC # FLD: 16.2 %
WBC # FLD AUTO: 16.12 K/UL

## 2021-06-20 ENCOUNTER — OUTPATIENT (OUTPATIENT)
Dept: OUTPATIENT SERVICES | Facility: HOSPITAL | Age: 71
LOS: 1 days | Discharge: HOME | End: 2021-06-20

## 2021-06-20 DIAGNOSIS — Z85.51 PERSONAL HISTORY OF MALIGNANT NEOPLASM OF BLADDER: Chronic | ICD-10-CM

## 2021-06-20 DIAGNOSIS — Z98.890 OTHER SPECIFIED POSTPROCEDURAL STATES: Chronic | ICD-10-CM

## 2021-06-20 DIAGNOSIS — D47.1 CHRONIC MYELOPROLIFERATIVE DISEASE: ICD-10-CM

## 2021-06-21 ENCOUNTER — RX RENEWAL (OUTPATIENT)
Age: 71
End: 2021-06-21

## 2021-06-23 ENCOUNTER — APPOINTMENT (OUTPATIENT)
Dept: HEMATOLOGY ONCOLOGY | Facility: CLINIC | Age: 71
End: 2021-06-23
Payer: MEDICARE

## 2021-06-23 VITALS
HEIGHT: 66.14 IN | SYSTOLIC BLOOD PRESSURE: 159 MMHG | HEART RATE: 96 BPM | OXYGEN SATURATION: 97 % | TEMPERATURE: 98.2 F | BODY MASS INDEX: 31.36 KG/M2 | WEIGHT: 195.11 LBS | DIASTOLIC BLOOD PRESSURE: 83 MMHG | RESPIRATION RATE: 16 BRPM

## 2021-06-23 PROCEDURE — 99214 OFFICE O/P EST MOD 30 MIN: CPT

## 2021-06-23 NOTE — HISTORY OF PRESENT ILLNESS
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 Flow cytometry of the bone marrow aspirate lymphocytes showed no diagnostic abnormalities. Reticulin stain showed a slight increase and trichrome stain was negative for collagen (grade 1 fibrosis). Bone marrow aspirate iron stain showed iron stores present with ringed sideroblasts (less than 15% of cells). Quantitative BCR-ABL by RT-PCR negative. Cytogenetics showed a normal male karyotype. MDS FISH panel negative. Maintained on Hydrea therapy.\par \par History of renal cancer 10/2014-stage I(pT1a), status post right partial nephrectomy\par \par History of bladder cancer (noninvasive) 1994- status post TURBT. [de-identified] : BP has been elevated-he is working on this with PCP.\par S/P resection of recurrent bladder tumors. No c/o hematuria. Has 4 month  f/u [facility].\par No fevers or dysuria or c/o back pain.\par No complaints of headache, pruritus, chest pain, shortness of breath, abdominal/pelvic pain. No cough.\par Got COVID vaccines (Pfizer).\par S/P recent OS cataract surgery, with OD surgery planned next week.\par \par

## 2021-06-23 NOTE — PHYSICAL EXAM
[Fully active, able to carry on all pre-disease performance without restriction] : Status 0 - Fully active, able to carry on all pre-disease performance without restriction [Normal] : affect appropriate [de-identified] : non-toxic appearing

## 2021-06-23 NOTE — ASSESSMENT
[FreeTextEntry1] : Lab work reviewed with patient.\par Myeloproliferative disorder with thrombocytosis/leukocytosis-clinically stable at present. Patient will continue with current Hydrea dosing. He also remains on aspirin 81 mg daily.\par \par Patient continues under urologic care at [facility] [place] for his  malignancy.  He is to continue follow-up with nephrologist for his kidney disease as well.\par \par Patient to f/u with PCP for ongoing BP management.\par \par Patient was given the opportunity to ask questions. His questions have been answered to his apparent satisfaction at this time.  He is agreeable to recommended follow-up.

## 2021-06-28 ENCOUNTER — APPOINTMENT (OUTPATIENT)
Dept: FAMILY MEDICINE | Facility: CLINIC | Age: 71
End: 2021-06-28
Payer: MEDICARE

## 2021-06-28 VITALS
BODY MASS INDEX: 30.76 KG/M2 | TEMPERATURE: 98.3 F | OXYGEN SATURATION: 96 % | WEIGHT: 196 LBS | HEART RATE: 89 BPM | SYSTOLIC BLOOD PRESSURE: 163 MMHG | DIASTOLIC BLOOD PRESSURE: 80 MMHG | RESPIRATION RATE: 16 BRPM | HEIGHT: 67 IN

## 2021-06-28 DIAGNOSIS — H26.9 UNSPECIFIED CATARACT: ICD-10-CM

## 2021-06-28 DIAGNOSIS — Z01.818 ENCOUNTER FOR OTHER PREPROCEDURAL EXAMINATION: ICD-10-CM

## 2021-06-28 PROCEDURE — 99214 OFFICE O/P EST MOD 30 MIN: CPT

## 2021-06-28 NOTE — HISTORY OF PRESENT ILLNESS
[No Pertinent Cardiac History] : no history of aortic stenosis, atrial fibrillation, coronary artery disease, recent myocardial infarction, or implantable device/pacemaker [No Pertinent Pulmonary History] : no history of asthma, COPD, sleep apnea, or smoking [No Adverse Anesthesia Reaction] : no adverse anesthesia reaction in self or family member [Chronic Kidney Disease] : chronic kidney disease [Aortic Stenosis] : no aortic stenosis [Atrial Fibrillation] : no atrial fibrillation [Coronary Artery Disease] : no coronary artery disease [Recent Myocardial Infarction] : no recent myocardial infarction [Implantable Device/Pacemaker] : no implantable device/pacemaker [Asthma] : no asthma [COPD] : no COPD [Sleep Apnea] : no sleep apnea [Smoker] : not a smoker [Family Member] : no family member with adverse anesthesia reaction/sudden death [Self] : no previous adverse anesthesia reaction [Chronic Anticoagulation] : no chronic anticoagulation [Diabetes] : no diabetes [FreeTextEntry1] : Right cataract [FreeTextEntry2] : June 30, 2021 [FreeTextEntry3] : Dr. Jadon Randolph [FreeTextEntry4] : Is a 70-year-old gentleman who presents today for preoperative clearance patient will be having cataract surgery of right eye Wednesday, June 30, 2021.  Surgery will be done at [facility] by Dr. Jadon Randolph.\par \par Medical history is significant for hypertension, anxiety, myeloproliferative disease and chronic renal insufficiency presently stable.\par \par Patient is awake alert and oriented x3 in no acute distress calm and cooperative.

## 2021-06-29 ENCOUNTER — TRANSCRIPTION ENCOUNTER (OUTPATIENT)
Age: 71
End: 2021-06-29

## 2021-06-30 ENCOUNTER — OUTPATIENT (OUTPATIENT)
Dept: OUTPATIENT SERVICES | Facility: HOSPITAL | Age: 71
LOS: 1 days | End: 2021-06-30
Payer: MEDICARE

## 2021-06-30 VITALS
SYSTOLIC BLOOD PRESSURE: 135 MMHG | HEART RATE: 76 BPM | TEMPERATURE: 98 F | RESPIRATION RATE: 16 BRPM | DIASTOLIC BLOOD PRESSURE: 68 MMHG | OXYGEN SATURATION: 98 %

## 2021-06-30 VITALS
TEMPERATURE: 98 F | HEART RATE: 84 BPM | SYSTOLIC BLOOD PRESSURE: 142 MMHG | HEIGHT: 67 IN | DIASTOLIC BLOOD PRESSURE: 72 MMHG | WEIGHT: 195.99 LBS | RESPIRATION RATE: 18 BRPM | OXYGEN SATURATION: 96 %

## 2021-06-30 DIAGNOSIS — Z85.51 PERSONAL HISTORY OF MALIGNANT NEOPLASM OF BLADDER: Chronic | ICD-10-CM

## 2021-06-30 DIAGNOSIS — H25.10 AGE-RELATED NUCLEAR CATARACT, UNSPECIFIED EYE: ICD-10-CM

## 2021-06-30 DIAGNOSIS — Z98.890 OTHER SPECIFIED POSTPROCEDURAL STATES: Chronic | ICD-10-CM

## 2021-06-30 PROCEDURE — 66984 XCAPSL CTRC RMVL W/O ECP: CPT | Mod: RT

## 2021-06-30 PROCEDURE — V2632: CPT

## 2021-06-30 RX ORDER — ACETAMINOPHEN 500 MG
650 TABLET ORAL EVERY 6 HOURS
Refills: 0 | Status: DISCONTINUED | OUTPATIENT
Start: 2021-06-30 | End: 2021-07-14

## 2021-06-30 RX ORDER — SODIUM CHLORIDE 9 MG/ML
1000 INJECTION, SOLUTION INTRAVENOUS
Refills: 0 | Status: DISCONTINUED | OUTPATIENT
Start: 2021-06-30 | End: 2021-06-30

## 2021-06-30 RX ORDER — CLONAZEPAM 1 MG
0 TABLET ORAL
Qty: 0 | Refills: 0 | DISCHARGE

## 2021-06-30 RX ORDER — TROPICAMIDE 1 %
1 DROPS OPHTHALMIC (EYE)
Refills: 0 | Status: COMPLETED | OUTPATIENT
Start: 2021-06-30 | End: 2021-06-30

## 2021-06-30 RX ORDER — KETOROLAC TROMETHAMINE 0.5 %
1 DROPS OPHTHALMIC (EYE)
Refills: 0 | Status: COMPLETED | OUTPATIENT
Start: 2021-06-30 | End: 2021-06-30

## 2021-06-30 RX ORDER — HYDROXYUREA 500 MG/1
0 CAPSULE ORAL
Qty: 0 | Refills: 0 | DISCHARGE

## 2021-06-30 RX ORDER — CYCLOPENTOLATE HYDROCHLORIDE 10 MG/ML
1 SOLUTION/ DROPS OPHTHALMIC
Refills: 0 | Status: COMPLETED | OUTPATIENT
Start: 2021-06-30 | End: 2021-06-30

## 2021-06-30 RX ORDER — AMLODIPINE BESYLATE 2.5 MG/1
1 TABLET ORAL
Qty: 0 | Refills: 0 | DISCHARGE

## 2021-06-30 RX ORDER — PHENYLEPHRINE HCL 2.5 %
1 DROPS OPHTHALMIC (EYE)
Refills: 0 | Status: COMPLETED | OUTPATIENT
Start: 2021-06-30 | End: 2021-06-30

## 2021-06-30 RX ORDER — ASPIRIN/CALCIUM CARB/MAGNESIUM 324 MG
0 TABLET ORAL
Qty: 0 | Refills: 0 | DISCHARGE

## 2021-06-30 RX ADMIN — Medication 1 DROP(S): at 07:02

## 2021-06-30 RX ADMIN — Medication 1 DROP(S): at 07:12

## 2021-06-30 RX ADMIN — CYCLOPENTOLATE HYDROCHLORIDE 1 DROP(S): 10 SOLUTION/ DROPS OPHTHALMIC at 07:02

## 2021-06-30 RX ADMIN — CYCLOPENTOLATE HYDROCHLORIDE 1 DROP(S): 10 SOLUTION/ DROPS OPHTHALMIC at 07:12

## 2021-06-30 RX ADMIN — Medication 1 DROP(S): at 07:07

## 2021-06-30 RX ADMIN — CYCLOPENTOLATE HYDROCHLORIDE 1 DROP(S): 10 SOLUTION/ DROPS OPHTHALMIC at 07:07

## 2021-06-30 NOTE — BRIEF OPERATIVE NOTE - NSICDXBRIEFPROCEDURE_GEN_ALL_CORE_FT
PROCEDURES:  Single stage extracapsular removal of cataract with insertion of intraocular lens prosthesis by phacoemulsification 30-Jun-2021 08:53:01  Jadon Randolph

## 2021-06-30 NOTE — ASU PREOP CHECKLIST - MEDICAL/PEDIATRIC CLEARANCE ON MEDICAL RECORD
Assessment  Atypical chest pain, neg trop thus far  Htn controlled  Hyperlipidemia  FH CAD  PFO      Rec  Cont current meds  echo   Ex nuclear stress today  If above neg may dc home later today Medical clearance on chart

## 2021-08-02 NOTE — ASU PREOP CHECKLIST - LATEX ALLERGY
no Itraconazole Counseling:  I discussed with the patient the risks of itraconazole including but not limited to liver damage, nausea/vomiting, neuropathy, and severe allergy.  The patient understands that this medication is best absorbed when taken with acidic beverages such as non-diet cola or ginger ale.  The patient understands that monitoring is required including baseline LFTs and repeat LFTs at intervals.  The patient understands that they are to contact us or the primary physician if concerning signs are noted.

## 2021-08-09 ENCOUNTER — LABORATORY RESULT (OUTPATIENT)
Age: 71
End: 2021-08-09

## 2021-08-10 ENCOUNTER — NON-APPOINTMENT (OUTPATIENT)
Age: 71
End: 2021-08-10

## 2021-08-10 LAB
BASOPHILS # BLD AUTO: 0.61 K/UL
BASOPHILS NFR BLD AUTO: 3.5 %
EOSINOPHIL # BLD AUTO: 0.16 K/UL
EOSINOPHIL NFR BLD AUTO: 0.9 %
HCT VFR BLD CALC: 49.1 %
HGB BLD-MCNC: 15.7 G/DL
LYMPHOCYTES # BLD AUTO: 1.97 K/UL
LYMPHOCYTES NFR BLD AUTO: 11.3 %
MAN DIFF?: NORMAL
MCHC RBC-ENTMCNC: 32 GM/DL
MCHC RBC-ENTMCNC: 32.4 PG
MCV RBC AUTO: 101.4 FL
MONOCYTES # BLD AUTO: 0.61 K/UL
MONOCYTES NFR BLD AUTO: 3.5 %
NEUTROPHILS # BLD AUTO: 13.33 K/UL
NEUTROPHILS NFR BLD AUTO: 74.8 %
PLATELET # BLD AUTO: 455 K/UL
RBC # BLD: 4.84 M/UL
RBC # FLD: 15.9 %
WBC # FLD AUTO: 17.42 K/UL

## 2021-09-14 ENCOUNTER — NON-APPOINTMENT (OUTPATIENT)
Age: 71
End: 2021-09-14

## 2021-09-14 ENCOUNTER — APPOINTMENT (OUTPATIENT)
Dept: CARDIOLOGY | Facility: CLINIC | Age: 71
End: 2021-09-14
Payer: MEDICARE

## 2021-09-14 VITALS
SYSTOLIC BLOOD PRESSURE: 162 MMHG | WEIGHT: 193 LBS | DIASTOLIC BLOOD PRESSURE: 77 MMHG | HEART RATE: 80 BPM | RESPIRATION RATE: 16 BRPM | BODY MASS INDEX: 30.29 KG/M2 | OXYGEN SATURATION: 100 % | HEIGHT: 67 IN | TEMPERATURE: 98.5 F

## 2021-09-14 VITALS — DIASTOLIC BLOOD PRESSURE: 80 MMHG | SYSTOLIC BLOOD PRESSURE: 140 MMHG | HEART RATE: 88 BPM

## 2021-09-14 DIAGNOSIS — E87.5 HYPERKALEMIA: ICD-10-CM

## 2021-09-14 PROCEDURE — 99205 OFFICE O/P NEW HI 60 MIN: CPT

## 2021-09-14 PROCEDURE — 93000 ELECTROCARDIOGRAM COMPLETE: CPT

## 2021-09-22 LAB
ALBUMIN SERPL ELPH-MCNC: 5.4 G/DL
ANION GAP SERPL CALC-SCNC: 13 MMOL/L
BUN SERPL-MCNC: 21 MG/DL
CALCIUM SERPL-MCNC: 10.7 MG/DL
CHLORIDE SERPL-SCNC: 100 MMOL/L
CHOLEST SERPL-MCNC: 197 MG/DL
CO2 SERPL-SCNC: 30 MMOL/L
CREAT SERPL-MCNC: 1.42 MG/DL
GLUCOSE SERPL-MCNC: 142 MG/DL
HDLC SERPL-MCNC: 36 MG/DL
LDLC SERPL CALC-MCNC: 120 MG/DL
NONHDLC SERPL-MCNC: 160 MG/DL
PHOSPHATE SERPL-MCNC: 3.6 MG/DL
POTASSIUM SERPL-SCNC: 5.6 MMOL/L
SODIUM SERPL-SCNC: 143 MMOL/L
TRIGL SERPL-MCNC: 200 MG/DL

## 2021-09-28 LAB
ALBUMIN SERPL ELPH-MCNC: 5.2 G/DL
ANION GAP SERPL CALC-SCNC: 15 MMOL/L
BUN SERPL-MCNC: 20 MG/DL
CALCIUM SERPL-MCNC: 10.2 MG/DL
CHLORIDE SERPL-SCNC: 101 MMOL/L
CO2 SERPL-SCNC: 26 MMOL/L
CREAT SERPL-MCNC: 1.32 MG/DL
GLUCOSE SERPL-MCNC: 93 MG/DL
PHOSPHATE SERPL-MCNC: 3 MG/DL
POTASSIUM SERPL-SCNC: 5 MMOL/L
SODIUM SERPL-SCNC: 142 MMOL/L

## 2021-09-30 ENCOUNTER — APPOINTMENT (OUTPATIENT)
Dept: CARDIOLOGY | Facility: CLINIC | Age: 71
End: 2021-09-30
Payer: MEDICARE

## 2021-09-30 ENCOUNTER — NON-APPOINTMENT (OUTPATIENT)
Age: 71
End: 2021-09-30

## 2021-09-30 VITALS
HEIGHT: 67 IN | HEART RATE: 74 BPM | SYSTOLIC BLOOD PRESSURE: 145 MMHG | RESPIRATION RATE: 16 BRPM | OXYGEN SATURATION: 97 % | TEMPERATURE: 97.5 F | DIASTOLIC BLOOD PRESSURE: 74 MMHG | WEIGHT: 195 LBS | BODY MASS INDEX: 30.61 KG/M2

## 2021-09-30 VITALS — DIASTOLIC BLOOD PRESSURE: 68 MMHG | SYSTOLIC BLOOD PRESSURE: 130 MMHG | HEART RATE: 80 BPM

## 2021-09-30 PROCEDURE — 99214 OFFICE O/P EST MOD 30 MIN: CPT

## 2021-09-30 PROCEDURE — 93000 ELECTROCARDIOGRAM COMPLETE: CPT

## 2021-09-30 PROCEDURE — 93306 TTE W/DOPPLER COMPLETE: CPT

## 2021-09-30 NOTE — REASON FOR VISIT
[Symptom and Test Evaluation] : symptom and test evaluation [Hypertension] : hypertension [FreeTextEntry1] : Patient returns for followup. Feeling well. Offers no complaints of chest discomfort shortness of breath palpitations dizziness or syncope.Underwent echocardiography today which reveals normal left ventricular systolic function and no evidence of significant valvular stenosis or insufficiency. Initial renal profile did reveal elevated potassium but on repeat it had come down to 5.\par

## 2021-09-30 NOTE — ASSESSMENT
[FreeTextEntry1] : Impression:\par 1. Patient with multiple risk factors for coronary disease. Blood pressure currently well controlled\par \par Plan:\par 1. Exercise stress test to assess for underlying myocardial ischemia

## 2021-10-04 ENCOUNTER — LABORATORY RESULT (OUTPATIENT)
Age: 71
End: 2021-10-04

## 2021-10-05 LAB
ALBUMIN SERPL ELPH-MCNC: 5.2 G/DL
ALP BLD-CCNC: 71 U/L
ALT SERPL-CCNC: 25 U/L
AST SERPL-CCNC: 32 U/L
BASOPHILS # BLD AUTO: 1.27 K/UL
BASOPHILS NFR BLD AUTO: 5.2 %
BILIRUB DIRECT SERPL-MCNC: 0.1 MG/DL
BILIRUB INDIRECT SERPL-MCNC: 0.4 MG/DL
BILIRUB SERPL-MCNC: 0.6 MG/DL
CREAT SERPL-MCNC: 1.48 MG/DL
EOSINOPHIL # BLD AUTO: 0.42 K/UL
EOSINOPHIL NFR BLD AUTO: 1.7 %
HCT VFR BLD CALC: 48.9 %
HGB BLD-MCNC: 16 G/DL
LYMPHOCYTES # BLD AUTO: 2.99 K/UL
LYMPHOCYTES NFR BLD AUTO: 12.2 %
MAN DIFF?: NORMAL
MCHC RBC-ENTMCNC: 32.7 GM/DL
MCHC RBC-ENTMCNC: 32.7 PG
MCV RBC AUTO: 99.8 FL
MONOCYTES # BLD AUTO: 0.86 K/UL
MONOCYTES NFR BLD AUTO: 3.5 %
NEUTROPHILS # BLD AUTO: 17.7 K/UL
NEUTROPHILS NFR BLD AUTO: 69.6 %
PLATELET # BLD AUTO: 577 K/UL
PROT SERPL-MCNC: 7.2 G/DL
RBC # BLD: 4.9 M/UL
RBC # FLD: 17.3 %
WBC # FLD AUTO: 24.51 K/UL

## 2021-10-18 ENCOUNTER — APPOINTMENT (OUTPATIENT)
Dept: NEPHROLOGY | Facility: CLINIC | Age: 71
End: 2021-10-18
Payer: MEDICARE

## 2021-10-18 ENCOUNTER — OUTPATIENT (OUTPATIENT)
Dept: OUTPATIENT SERVICES | Facility: HOSPITAL | Age: 71
LOS: 1 days | Discharge: HOME | End: 2021-10-18

## 2021-10-18 VITALS
TEMPERATURE: 97.9 F | BODY MASS INDEX: 30.45 KG/M2 | HEIGHT: 67 IN | DIASTOLIC BLOOD PRESSURE: 69 MMHG | HEART RATE: 95 BPM | OXYGEN SATURATION: 95 % | WEIGHT: 194 LBS | SYSTOLIC BLOOD PRESSURE: 127 MMHG

## 2021-10-18 VITALS — SYSTOLIC BLOOD PRESSURE: 152 MMHG | DIASTOLIC BLOOD PRESSURE: 70 MMHG

## 2021-10-18 DIAGNOSIS — Z85.51 PERSONAL HISTORY OF MALIGNANT NEOPLASM OF BLADDER: Chronic | ICD-10-CM

## 2021-10-18 DIAGNOSIS — Z98.890 OTHER SPECIFIED POSTPROCEDURAL STATES: Chronic | ICD-10-CM

## 2021-10-18 DIAGNOSIS — D47.1 CHRONIC MYELOPROLIFERATIVE DISEASE: ICD-10-CM

## 2021-10-18 PROCEDURE — 99215 OFFICE O/P EST HI 40 MIN: CPT

## 2021-10-18 NOTE — ASSESSMENT
[FreeTextEntry1] : 70 years old man with mild CKD here for follow up evaluation.\par \par Chronic Kidney Disease Stage III -- Secondary to hypertension and partial nephrectomy.  I have reviewed the [facility] from [facility] including the most recent renal panel and provided my independent assessment that this is stable CKD.  Last creatinine was 1.4. We spent most of the visit discussing chronic kidney disease, its stages, risk for progression and strategies for prevention including avoidance of NSAIDs and notifying me of medication changes.  I will check a urine prot/cr ratio as well as a cystatin c to better estimate the prognosis of his kidney disease.  Follow up in 3 months.\par \par \par Hypertension -- The patient's blood pressure is not at goal.  The reason for his sudden worsening is unclear.  Renal artery stenosis is possible.  He has a CT of the abdomen with contrast scheduled for January and he will forward those results to me.  May need to consider evaluation of renal arteries.  I asked him to increase his torsemide to 20 for three days and check his blood pressure and call me on Wednesday to report in.

## 2021-10-18 NOTE — HISTORY OF PRESENT ILLNESS
[FreeTextEntry1] : Today I had the pleasure of meeting Drew Morales he is a 68 years old man who was born and raised in [place].  He used to live near [facility] and is a big car and baseball fan.  He now is retired and lives out on [place].  He has had a prior history of partial nephrectomy and hypertension for which he followed with Dr. Ortega.  On presentation today he feels great.  He has no complaints.  He is not SOB he has no NVD.  Urinating is ok.  Sometimes he has a little incontinence but he does not have a sensation of incomplete voiding.  He has recently had some cervical spinal issues and was taking mobic but his creatinine and bp have been stable.\par \par 10/18/21 -- Drew is here to see me today after about 2 years due to the pandemic.  He was recently told to come back and see me because of his creatinine, blood pressure, and potassium.  He reports that he continues to follow up with his hematologist for thrombocytosis, continues to follow up with internal medicine.  He recently established care with a cardiologist for erractic blood presure.  He reports that at home his blood pressure is often 160 to 190.  He was started on amlodipine last month and it seems to work but BP still elevated.  He reports a poor diet which consists of eating out daily frequently at fast food restaurants.  No palpitations no SOB.

## 2021-10-18 NOTE — REVIEW OF SYSTEMS
[Fever] : no fever [Chills] : no chills [Feeling Poorly] : not feeling poorly [Feeling Tired] : not feeling tired [Eyesight Problems] : no eyesight problems [Nosebleeds] : no nosebleeds [Nasal Discharge] : no nasal discharge [Chest Pain] : no chest pain [Palpitations] : no palpitations [Leg Claudication] : no intermittent leg claudication [Lower Ext Edema] : no extremity edema [Shortness Of Breath] : no shortness of breath [Abdominal Pain] : no abdominal pain [Vomiting] : no vomiting [As Noted in HPI] : as noted in HPI [Arthralgias] : no arthralgias [Joint Swelling] : no joint swelling [Joint Stiffness] : no joint stiffness [Dizziness] : no dizziness [Fainting] : no fainting [Easy Bleeding] : no tendency for easy bleeding [Easy Bruising] : no tendency for easy bruising

## 2021-10-18 NOTE — PHYSICAL EXAM
[General Appearance - Alert] : alert [General Appearance - In No Acute Distress] : in no acute distress [General Appearance - Well Nourished] : well nourished [Sclera] : the sclera and conjunctiva were normal [Hearing Threshold Finger Rub Not [place]] : hearing was normal [Neck Appearance] : the appearance of the neck was normal [Neck Cervical Mass (___cm)] : no neck mass was observed [Jugular Venous Distention Increased] : there was no jugular-venous distention [Respiration, Rhythm And Depth] : normal respiratory rhythm and effort [Exaggerated Use Of Accessory Muscles For Inspiration] : no accessory muscle use [Auscultation Breath Sounds / Voice Sounds] : lungs were clear to auscultation bilaterally [Heart Sounds] : normal S1 and S2 [Heart Sounds Gallop] : no gallops [Murmurs] : no murmurs [Heart Sounds Pericardial Friction Rub] : no pericardial rub [___ +] : bilateral [unfilled]+ pretibial pitting edema [Abdomen Soft] : soft [Abdomen Tenderness] : non-tender [Abdomen Mass (___ Cm)] : no abdominal mass palpated [FreeTextEntry1] : abdomen wrapped to contain hernias [] : no rash [Oriented To Time, Place, And Person] : oriented to person, place, and time [Impaired Insight] : insight and judgment were intact [Affect] : the affect was normal [Mood] : the mood was normal

## 2021-10-19 ENCOUNTER — NON-APPOINTMENT (OUTPATIENT)
Age: 71
End: 2021-10-19

## 2021-10-19 ENCOUNTER — APPOINTMENT (OUTPATIENT)
Dept: HEMATOLOGY ONCOLOGY | Facility: CLINIC | Age: 71
End: 2021-10-19
Payer: MEDICARE

## 2021-10-19 LAB
CREAT SPEC-SCNC: 204 MG/DL
CREAT/PROT UR: 0.1 RATIO
CYSTATIN C SERPL-MCNC: 1.31 MG/L
GFR/BSA.PRED SERPLBLD CYS-BASED-ARV: 52 ML/MIN
PROT UR-MCNC: 17 MG/DL

## 2021-10-20 ENCOUNTER — APPOINTMENT (OUTPATIENT)
Dept: CARDIOLOGY | Facility: CLINIC | Age: 71
End: 2021-10-20

## 2021-10-20 NOTE — END OF VISIT
[Time Spent: ___ minutes] : I have spent [unfilled] minutes of time on the encounter. Quinolones Counseling:  I discussed with the patient the risks of fluoroquinolones including but not limited to GI upset, allergic reaction, drug rash, diarrhea, dizziness, photosensitivity, yeast infections, liver function test abnormalities, tendonitis/tendon rupture.

## 2021-10-23 ENCOUNTER — RX RENEWAL (OUTPATIENT)
Age: 71
End: 2021-10-23

## 2021-10-26 ENCOUNTER — RX RENEWAL (OUTPATIENT)
Age: 71
End: 2021-10-26

## 2021-11-15 ENCOUNTER — APPOINTMENT (OUTPATIENT)
Dept: HEMATOLOGY ONCOLOGY | Facility: CLINIC | Age: 71
End: 2021-11-15
Payer: MEDICARE

## 2021-11-15 VITALS
RESPIRATION RATE: 16 BRPM | SYSTOLIC BLOOD PRESSURE: 159 MMHG | TEMPERATURE: 97.5 F | HEIGHT: 67 IN | DIASTOLIC BLOOD PRESSURE: 82 MMHG | OXYGEN SATURATION: 98 % | WEIGHT: 195.11 LBS | HEART RATE: 78 BPM | BODY MASS INDEX: 30.62 KG/M2

## 2021-11-15 VITALS — SYSTOLIC BLOOD PRESSURE: 147 MMHG | DIASTOLIC BLOOD PRESSURE: 73 MMHG

## 2021-11-15 PROCEDURE — 99214 OFFICE O/P EST MOD 30 MIN: CPT

## 2021-11-15 NOTE — HISTORY OF PRESENT ILLNESS
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 Flow cytometry of the bone marrow aspirate lymphocytes showed no diagnostic abnormalities. Reticulin stain showed a slight increase and trichrome stain was negative for collagen (grade 1 fibrosis). Bone marrow aspirate iron stain showed iron stores present with ringed sideroblasts (less than 15% of cells). Quantitative BCR-ABL by RT-PCR negative. Cytogenetics showed a normal male karyotype. MDS FISH panel negative. Maintained on Hydrea therapy.\par \par History of renal cancer 10/2014-stage I(pT1a), status post right partial nephrectomy\par \par History of bladder cancer (noninvasive) 1994- status post TURBT. [de-identified] : BP has been elevated-patient is working on this with nephrologist and cardiologist.\par S/P fulguration bladder tumors. 1 week post procedure-had gross hematuria. Treated for infection-bacterial/fungal.\par Has f/u planned for another cystoscopy.\par No fevers.\par No complaints of headache, pruritus, chest pain, shortness of breath, abdominal/pelvic pain. No cough.\par \par Got COVID vaccines (Pfizer).

## 2021-11-15 NOTE — PHYSICAL EXAM
[Fully active, able to carry on all pre-disease performance without restriction] : Status 0 - Fully active, able to carry on all pre-disease performance without restriction [Normal] : affect appropriate [de-identified] : non-toxic appearing

## 2021-11-15 NOTE — CONSULT LETTER
[Dear  ___] : Dear  [unfilled], [Please see my note below.] : Please see my note below. [Courtesy Letter:] : I had the pleasure of seeing your patient, [unfilled], in my office today. [Consult Closing:] : Thank you very much for allowing me to participate in the care of this patient.  If you have any questions, please do not hesitate to contact me. [Sincerely,] : Sincerely, [FreeTextEntry3] : Katie Fernandez MD

## 2021-12-20 ENCOUNTER — RX RENEWAL (OUTPATIENT)
Age: 71
End: 2021-12-20

## 2021-12-22 ENCOUNTER — APPOINTMENT (OUTPATIENT)
Dept: CARDIOLOGY | Facility: CLINIC | Age: 71
End: 2021-12-22
Payer: MEDICARE

## 2021-12-22 PROCEDURE — 93015 CV STRESS TEST SUPVJ I&R: CPT

## 2021-12-22 PROCEDURE — 99214 OFFICE O/P EST MOD 30 MIN: CPT | Mod: 25

## 2021-12-22 NOTE — REASON FOR VISIT
[Coronary Artery Disease] : coronary artery disease [FreeTextEntry1] : Patient presented today for stress testing. He was able to complete approximately 5 minutes of a Lalo protocol and stopped because of fatigue and some shortness of breath. The patient did have an abnormal EKG response with 2 mm ST segment depressions in the inferior and anterolateral leads. He had a normotensive to hypertensive response.\par \par Given the above I discussed cardiac catheterization with the patient. Although he did get well past stage I and did have a normotensive response I explained to him that he could be treated medically or with angiography. He does represent somewhat of a higher risk for angiography due to the fact that he has significant bleeding risk and has had significant bleeding during surgical procedures in the past. Should he need a stent bare metal stenting might be preferable in this patient. Patient has been most concerned over his blood pressure and I explained to him that revascularization would not improve that.\par \par Ultimately since he is asymptomatic and had no high-risk features and continues to have some issues with hypertension and tachycardia we mutually decided to start him on beta blocker and reassess him in one month's time. At that time once again consideration will be given either continued medical therapy or to angiography. The patient was advised of the signs and symptoms of angina and told to phone should these begin

## 2022-01-06 ENCOUNTER — APPOINTMENT (OUTPATIENT)
Dept: NEPHROLOGY | Facility: CLINIC | Age: 72
End: 2022-01-06
Payer: MEDICARE

## 2022-01-06 ENCOUNTER — LABORATORY RESULT (OUTPATIENT)
Age: 72
End: 2022-01-06

## 2022-01-06 VITALS
WEIGHT: 196.21 LBS | DIASTOLIC BLOOD PRESSURE: 84 MMHG | SYSTOLIC BLOOD PRESSURE: 159 MMHG | HEIGHT: 67 IN | BODY MASS INDEX: 30.8 KG/M2 | TEMPERATURE: 97.7 F | HEART RATE: 88 BPM

## 2022-01-06 VITALS — SYSTOLIC BLOOD PRESSURE: 131 MMHG | DIASTOLIC BLOOD PRESSURE: 70 MMHG

## 2022-01-06 VITALS — DIASTOLIC BLOOD PRESSURE: 74 MMHG | SYSTOLIC BLOOD PRESSURE: 143 MMHG

## 2022-01-06 PROCEDURE — 99214 OFFICE O/P EST MOD 30 MIN: CPT

## 2022-01-06 RX ORDER — PREDNISOLONE ACETATE 10 MG/ML
1 SUSPENSION/ DROPS OPHTHALMIC
Qty: 5 | Refills: 0 | Status: DISCONTINUED | COMMUNITY
Start: 2021-06-15 | End: 2022-01-06

## 2022-01-06 RX ORDER — MOXIFLOXACIN OPHTHALMIC 5 MG/ML
0.5 SOLUTION/ DROPS OPHTHALMIC
Qty: 3 | Refills: 0 | Status: DISCONTINUED | COMMUNITY
Start: 2021-06-15 | End: 2022-01-06

## 2022-01-06 RX ORDER — TAPENTADOL HYDROCHLORIDE 50 MG/1
50 TABLET, FILM COATED ORAL 3 TIMES DAILY
Qty: 21 | Refills: 0 | Status: DISCONTINUED | COMMUNITY
Start: 2018-10-30 | End: 2022-01-06

## 2022-01-06 RX ORDER — ACETAZOLAMIDE 500 MG/1
500 CAPSULE ORAL
Qty: 1 | Refills: 0 | Status: DISCONTINUED | COMMUNITY
Start: 2021-06-15 | End: 2022-01-06

## 2022-01-06 RX ORDER — TRAMADOL HYDROCHLORIDE 50 MG/1
50 TABLET, COATED ORAL
Qty: 120 | Refills: 0 | Status: DISCONTINUED | COMMUNITY
Start: 2019-09-20 | End: 2022-01-06

## 2022-01-06 RX ORDER — TOCOPHERSOLAN (VITAMIN E TPGS) 400/15ML
LIQUID (ML) ORAL
Refills: 0 | Status: DISCONTINUED | COMMUNITY
End: 2022-01-06

## 2022-01-06 RX ORDER — CYCLOBENZAPRINE HYDROCHLORIDE 7.5 MG/1
TABLET, FILM COATED ORAL
Refills: 0 | Status: DISCONTINUED | COMMUNITY
End: 2022-01-06

## 2022-01-06 RX ORDER — AMPICILLIN 500 MG/1
500 CAPSULE ORAL
Qty: 28 | Refills: 0 | Status: DISCONTINUED | COMMUNITY
Start: 2021-10-22 | End: 2022-01-06

## 2022-01-06 RX ORDER — AMLODIPINE BESYLATE 5 MG/1
5 TABLET ORAL
Qty: 90 | Refills: 3 | Status: DISCONTINUED | COMMUNITY
Start: 2021-10-23 | End: 2022-01-06

## 2022-01-06 NOTE — REVIEW OF SYSTEMS
[As Noted in HPI] : as noted in HPI [Anxiety] : anxiety [Easy Bleeding] : a tendency for easy bleeding [Fever] : no fever [Chills] : no chills [Feeling Poorly] : not feeling poorly [Feeling Tired] : not feeling tired [Eyesight Problems] : no eyesight problems [Nosebleeds] : no nosebleeds [Nasal Discharge] : no nasal discharge [Chest Pain] : no chest pain [Palpitations] : no palpitations [Leg Claudication] : no intermittent leg claudication [Lower Ext Edema] : no extremity edema [Shortness Of Breath] : no shortness of breath [Abdominal Pain] : no abdominal pain [Vomiting] : no vomiting [Arthralgias] : no arthralgias [Joint Swelling] : no joint swelling [Joint Stiffness] : no joint stiffness [Dizziness] : no dizziness [Fainting] : no fainting [Depression] : no depression [Easy Bruising] : no tendency for easy bruising

## 2022-01-06 NOTE — HISTORY OF PRESENT ILLNESS
[FreeTextEntry1] : Today I had the pleasure of seeing Drew Morales he was born and raised in [place].  He used to live near [facility] and is a big car and baseball fan.  He now is retired and lives out on [place].  He has had a prior history of partial nephrectomy and hypertension for which he followed with Dr. Ortega.  On presentation today he feels great.  He has no complaints.  He is not SOB he has no NVD.  Urinating is ok.  Sometimes he has a little incontinence but he does not have a sensation of incomplete voiding.  He has recently had some cervical spinal issues and was taking mobic but his creatinine and bp have been stable.\par \par 10/18/21 -- Drew is here to see me today after about 2 years due to the pandemic.  He was recently told to come back and see me because of his creatinine, blood pressure, and potassium.  He reports that he continues to follow up with his hematologist for thrombocytosis, continues to follow up with internal medicine.  He recently established care with a cardiologist for erractic blood presure.  He reports that at home his blood pressure is often 160 to 190.  He was started on amlodipine last month and it seems to work but BP still elevated.  He reports a poor diet which consists of eating out daily frequently at fast food restaurants.  No palpitations no SOB.\par \par 1/6/21 -- Saw Drew today and recent developments have included addition of metoprolol by Patricia Dill for management of CAD, bladder polyps recurrence and is following with [facility].  He describes no issues with urination no chest pain no shortness of breath but is overall very anxious about his heart and about the findings on his bladder.

## 2022-01-06 NOTE — PHYSICAL EXAM
[General Appearance - Alert] : alert [General Appearance - In No Acute Distress] : in no acute distress [General Appearance - Well Nourished] : well nourished [Sclera] : the sclera and conjunctiva were normal [Hearing Threshold Finger Rub Not R[place]] : hearing was normal [Neck Appearance] : the appearance of the neck was normal [Neck Cervical Mass (___cm)] : no neck mass was observed [Jugular Venous Distention Increased] : there was no jugular-venous distention [Respiration, Rhythm And Depth] : normal respiratory rhythm and effort [Exaggerated Use Of Accessory Muscles For Inspiration] : no accessory muscle use [Auscultation Breath Sounds / Voice Sounds] : lungs were clear to auscultation bilaterally [Heart Sounds] : normal S1 and S2 [Heart Sounds Gallop] : no gallops [Murmurs] : no murmurs [Heart Sounds Pericardial Friction Rub] : no pericardial rub [___ +] : bilateral [unfilled]+ pretibial pitting edema [Abdomen Soft] : soft [Abdomen Tenderness] : non-tender [Abdomen Mass (___ Cm)] : no abdominal mass palpated [] : no rash [Oriented To Time, Place, And Person] : oriented to person, place, and time [Impaired Insight] : insight and judgment were intact [Affect] : the affect was normal [Mood] : the mood was normal [FreeTextEntry1] : abdomen wrapped to contain hernias

## 2022-01-06 NOTE — ASSESSMENT
[FreeTextEntry1] : 71 years old man with mild CKD here for follow up evaluation.\par \par Chronic Kidney Disease Stage IIIa -- Secondary to hypertension and partial nephrectomy.  I have reviewed the notes from [facility] including the most recent renal panel and provided my independent assessment that this is stable CKD.  Last creatinine was 1.4. We spent most of the visit discussing chronic kidney disease, its stages, risk for progression and strategies for prevention including avoidance of NSAIDs and notifying me of medication changes.  I will check a urine prot/cr ratio as well as renal panel today to help better risk stratify for possible contrast load if angiogram is needed.  I explained to the patient that the angiogram contrast can potentially effect his kidney but that the benefits are likely to outweigh the risk.  f/u six months\par \par Hypertension -- The patient's blood pressure is not at goal but there is a strong anxiety component.  BP got better throughout the visit as he began to relax.   Renal artery stenosis is possible.  He has a CT of the abdomen with contrast scheduled for January and he will forward those results to me.  Metoprolol was recently added.  Would recommend he continue to monitor his BP at home. Improved

## 2022-01-18 ENCOUNTER — RX RENEWAL (OUTPATIENT)
Age: 72
End: 2022-01-18

## 2022-01-18 ENCOUNTER — NON-APPOINTMENT (OUTPATIENT)
Age: 72
End: 2022-01-18

## 2022-01-18 LAB
ALBUMIN SERPL ELPH-MCNC: 5.2 G/DL
ALBUMIN SERPL ELPH-MCNC: 5.3 G/DL
ALP BLD-CCNC: 78 U/L
ALT SERPL-CCNC: 26 U/L
ANION GAP SERPL CALC-SCNC: 15 MMOL/L
ANION GAP SERPL CALC-SCNC: 18 MMOL/L
AST SERPL-CCNC: 26 U/L
BASOPHILS # BLD AUTO: 0.59 K/UL
BASOPHILS NFR BLD AUTO: 2.6 %
BILIRUB SERPL-MCNC: 0.8 MG/DL
BUN SERPL-MCNC: 17 MG/DL
BUN SERPL-MCNC: 18 MG/DL
CALCIUM SERPL-MCNC: 10.2 MG/DL
CALCIUM SERPL-MCNC: 9.8 MG/DL
CHLORIDE SERPL-SCNC: 97 MMOL/L
CHLORIDE SERPL-SCNC: 98 MMOL/L
CO2 SERPL-SCNC: 26 MMOL/L
CO2 SERPL-SCNC: 26 MMOL/L
CREAT SERPL-MCNC: 1.32 MG/DL
CREAT SERPL-MCNC: 1.36 MG/DL
CREAT SPEC-SCNC: 28 MG/DL
CREAT/PROT UR: 0.2 RATIO
EOSINOPHIL # BLD AUTO: 0.97 K/UL
EOSINOPHIL NFR BLD AUTO: 4.3 %
FERRITIN SERPL-MCNC: 183 NG/ML
GLUCOSE SERPL-MCNC: 137 MG/DL
GLUCOSE SERPL-MCNC: 140 MG/DL
HCT VFR BLD CALC: 50.5 %
HGB BLD-MCNC: 16.2 G/DL
IRON SATN MFR SERPL: 30 %
IRON SERPL-MCNC: 111 UG/DL
LYMPHOCYTES # BLD AUTO: 2.16 K/UL
LYMPHOCYTES NFR BLD AUTO: 9.6 %
MAN DIFF?: NORMAL
MCHC RBC-ENTMCNC: 32.1 GM/DL
MCHC RBC-ENTMCNC: 33.1 PG
MCV RBC AUTO: 103.3 FL
MONOCYTES # BLD AUTO: 0.97 K/UL
MONOCYTES NFR BLD AUTO: 4.3 %
NEUTROPHILS # BLD AUTO: 17.63 K/UL
NEUTROPHILS NFR BLD AUTO: 78.3 %
PHOSPHATE SERPL-MCNC: 3.3 MG/DL
PLATELET # BLD AUTO: 521 K/UL
POTASSIUM SERPL-SCNC: 5.3 MMOL/L
POTASSIUM SERPL-SCNC: 5.6 MMOL/L
PROT SERPL-MCNC: 7.6 G/DL
PROT UR-MCNC: 5 MG/DL
RBC # BLD: 4.89 M/UL
RBC # FLD: 15.5 %
SODIUM SERPL-SCNC: 139 MMOL/L
SODIUM SERPL-SCNC: 141 MMOL/L
TIBC SERPL-MCNC: 369 UG/DL
UIBC SERPL-MCNC: 258 UG/DL
WBC # FLD AUTO: 22.52 K/UL

## 2022-02-03 ENCOUNTER — APPOINTMENT (OUTPATIENT)
Dept: CARDIOLOGY | Facility: CLINIC | Age: 72
End: 2022-02-03
Payer: MEDICARE

## 2022-02-03 ENCOUNTER — NON-APPOINTMENT (OUTPATIENT)
Age: 72
End: 2022-02-03

## 2022-02-03 VITALS
HEART RATE: 61 BPM | BODY MASS INDEX: 30.13 KG/M2 | HEIGHT: 67 IN | WEIGHT: 192 LBS | OXYGEN SATURATION: 97 % | TEMPERATURE: 97.1 F | RESPIRATION RATE: 16 BRPM

## 2022-02-03 VITALS — DIASTOLIC BLOOD PRESSURE: 82 MMHG | SYSTOLIC BLOOD PRESSURE: 130 MMHG

## 2022-02-03 VITALS — HEART RATE: 60 BPM

## 2022-02-03 DIAGNOSIS — Z00.00 ENCOUNTER FOR GENERAL ADULT MEDICAL EXAMINATION W/OUT ABNORMAL FINDINGS: ICD-10-CM

## 2022-02-03 PROCEDURE — 99215 OFFICE O/P EST HI 40 MIN: CPT

## 2022-02-03 PROCEDURE — 93000 ELECTROCARDIOGRAM COMPLETE: CPT

## 2022-02-03 NOTE — REASON FOR VISIT
[Symptom and Test Evaluation] : symptom and test evaluation [Coronary Artery Disease] : coronary artery disease [FreeTextEntry1] : Patient returns for followup. Feeling well. Offers no complaints of chest discomfort shortness of breath palpitations dizziness or syncope.\par

## 2022-02-03 NOTE — ASSESSMENT
[FreeTextEntry1] : Impression:\par 1. Presumed coronary disease based on abnormal stress test. Patient does not wish to have angiography at this time. He does have multiple medical problems and his concern over bleeding\par 2. Patient with lipid levels not at goal for a patient with coronary disease\par \par Plan:\par 1. Will start atorvastatin 10 mg per day\par 2. Will get exercise stress testing with nuclear imaging to assess for any high-risk features such as transient ischemic dilatation which would portend a worse prognosis and might lead us towards angiography

## 2022-02-05 LAB
ALBUMIN SERPL ELPH-MCNC: 5.4 G/DL
ALP BLD-CCNC: 68 U/L
ALT SERPL-CCNC: 25 U/L
AST SERPL-CCNC: 23 U/L
BILIRUB DIRECT SERPL-MCNC: 0.2 MG/DL
BILIRUB INDIRECT SERPL-MCNC: 0.6 MG/DL
BILIRUB SERPL-MCNC: 0.8 MG/DL
CHOLEST SERPL-MCNC: 204 MG/DL
CK SERPL-CCNC: 68 U/L
HDLC SERPL-MCNC: 32 MG/DL
LDLC SERPL CALC-MCNC: 118 MG/DL
NONHDLC SERPL-MCNC: 171 MG/DL
PROT SERPL-MCNC: 7.1 G/DL
TRIGL SERPL-MCNC: 267 MG/DL

## 2022-02-22 ENCOUNTER — APPOINTMENT (OUTPATIENT)
Dept: CARDIOLOGY | Facility: CLINIC | Age: 72
End: 2022-02-22

## 2022-03-04 ENCOUNTER — APPOINTMENT (OUTPATIENT)
Dept: FAMILY MEDICINE | Facility: CLINIC | Age: 72
End: 2022-03-04
Payer: MEDICARE

## 2022-03-04 VITALS
OXYGEN SATURATION: 96 % | HEART RATE: 94 BPM | SYSTOLIC BLOOD PRESSURE: 141 MMHG | TEMPERATURE: 98 F | HEIGHT: 67 IN | RESPIRATION RATE: 14 BRPM | WEIGHT: 190 LBS | BODY MASS INDEX: 29.82 KG/M2 | DIASTOLIC BLOOD PRESSURE: 80 MMHG

## 2022-03-04 PROCEDURE — 99215 OFFICE O/P EST HI 40 MIN: CPT

## 2022-03-04 NOTE — HISTORY OF PRESENT ILLNESS
[FreeTextEntry1] : See HPI. [de-identified] : Patient is a 71-year-old gentleman who presents today for follow-up and disease management recently seen by cardiology February 3, 2022 unfortunately patient did have an abnormal stress test cardiac catheterization was recommended patient subsequently had second opinion at [facility] and nuclear stress test will be performed prior to cardiac catheterization.  Medical history is significant for hyperlipidemia, chronic kidney disease stage III a which is stable, myeloproliferative disease recently seen by hematology patient also has underlying thrombocytopenia and leukocytosis, history of  malignancy in remission, generalized anxiety, hypertension and hyperlipidemia.  Patient has been seen by multiple subspecialists as stated earlier cardiology on February 3, 2022, nephrology Dr. Mata January 18, 2022 and hematology oncology Dr. Phillips November 15, 2021.\par \par Presently the patient is awake alert and oriented x3 in no acute distress presently the patient does not have any chest pain presently patient is asymptomatic does admit to occasional a feeling of fluttering in his chest but nothing more severe than that.\par \par Status post accidental fall contusion of right hand hand is swollen tender to touch patient unable to moveIndex finger that is right hand.

## 2022-03-04 NOTE — HEALTH RISK ASSESSMENT
[Former] : Former [20 or more] : 20 or more [No] : In the past 12 months have you used drugs other than those required for medical reasons? No [No falls in past year] : Patient reported no falls in the past year [0] : 2) Feeling down, depressed, or hopeless: Not at all (0) [PHQ-2 Negative - No further assessment needed] : PHQ-2 Negative - No further assessment needed [With Patient/Caregiver] : , with patient/caregiver [Reviewed no changes] : Reviewed, no changes [Name: ___] : Health Care Proxy's Name: [unfilled]  [Designated Healthcare Proxy] : Designated healthcare proxy [Relationship: ___] : Relationship: [unfilled] [Aggressive treatment] : aggressive treatment [I will adhere to the patient's wishes.] : I will adhere to the patient's wishes. [YearQuit] : 1994 [Audit-CScore] : 0 [[id number]] : 0 [AdvancecareDate] : 03/22

## 2022-03-04 NOTE — COUNSELING
[Fall prevention counseling provided] : Fall prevention counseling provided [Adequate lighting] : Adequate lighting [No throw rugs] : No throw rugs [Behavioral health counseling provided] : Behavioral health counseling provided [Sleep ___ hours/day] : Sleep [unfilled] hours/day [Engage in a relaxing activity] : Engage in a relaxing activity [AUDIT-C Screening administered and reviewed] : AUDIT-C Screening administered and reviewed [Potential consequences of obesity discussed] : Potential consequences of obesity discussed [Benefits of weight loss discussed] : Benefits of weight loss discussed [Structured Weight Management Program suggested:] : Structured weight management program suggested [Encouraged to maintain food diary] : Encouraged to maintain food diary [Encouraged to increase physical activity] : Encouraged to increase physical activity [Encouraged to use exercise tracking device] : Encouraged to use exercise tracking device [Target Wt Loss Goal ___] : Weight Loss Goals: Target weight loss goal [unfilled] lbs [Weigh Self Weekly] : weigh self weekly [Decrease Portions] : decrease portions [____ min/wk Activity] : [unfilled] min/wk activity [Keep Food Diary] : keep food diary [None] : None [Good understanding] : Patient has a good understanding of lifestyle changes and steps needed to achieve self management goal [FreeTextEntry1] : [place]

## 2022-03-04 NOTE — ASSESSMENT
[FreeTextEntry1] : Assessment and plan:\par \par 1.  Status post accidental fall and parking garage on [address] municipal parking patient has a contusion of his right hand unfortunately due to the swelling and immobility must rule out occult fracture.  Recommend continue icing and Tylenol for the pain.\par \par 2.  Status post stress test abnormal patient had second opinion at [place] scheduled for nuclear stress test most likely will require cardiac catheterization.\par \par 3.  Hyperlipidemia continue low-fat low-cholesterol diet and atorvastatin.\par \par 4.  Chronic kidney disease stage IIIa avoid all nephrotoxic medications especially nonsteroidal anti-inflammatory drugs I also recommend good hydration.\par \par 5.  Myeloproliferative disease patient is followed very closely by hematology oncology presently on hydroxyurea.\par \par 6.  History of  malignancy status post partial nephrectomy patient is followed at [facility].\par \par 7.  Generalized anxiety well controlled with Klonopin 1 mg patient takes medications very sparingly and only as needed.\par \par 8.  Hypertension has been difficult to control but we have gotten some good control with amlodipine 10 mg and clonidine 0.1 mg twice a day.\par \par Reviewed with patient stress test, and all medications.  Medications that needed to be refilled sent to pharmacy.\par \par Total time spent face-to-face and nonface-to-face time 45 minutes.

## 2022-03-04 NOTE — REVIEW OF SYSTEMS
[Negative] : Heme/Lymph [Joint Pain] : joint pain [Joint Stiffness] : joint stiffness [Joint Swelling] : joint swelling [FreeTextEntry9] : Status post accidental fall contusion of right hand hand is swollen tender to touch index finger immobile.

## 2022-03-05 ENCOUNTER — OUTPATIENT (OUTPATIENT)
Dept: OUTPATIENT SERVICES | Facility: HOSPITAL | Age: 72
LOS: 1 days | End: 2022-03-05
Payer: MEDICARE

## 2022-03-05 ENCOUNTER — APPOINTMENT (OUTPATIENT)
Dept: RADIOLOGY | Facility: HOSPITAL | Age: 72
End: 2022-03-05
Payer: MEDICARE

## 2022-03-05 DIAGNOSIS — Z98.890 OTHER SPECIFIED POSTPROCEDURAL STATES: Chronic | ICD-10-CM

## 2022-03-05 DIAGNOSIS — S60.221A CONTUSION OF RIGHT HAND, INITIAL ENCOUNTER: ICD-10-CM

## 2022-03-05 DIAGNOSIS — Z85.51 PERSONAL HISTORY OF MALIGNANT NEOPLASM OF BLADDER: Chronic | ICD-10-CM

## 2022-03-05 PROCEDURE — 73130 X-RAY EXAM OF HAND: CPT | Mod: 26,RT

## 2022-03-05 PROCEDURE — 73130 X-RAY EXAM OF HAND: CPT

## 2022-03-07 ENCOUNTER — RX RENEWAL (OUTPATIENT)
Age: 72
End: 2022-03-07

## 2022-03-07 DIAGNOSIS — T14.8XXA OTHER INJURY OF UNSPECIFIED BODY REGION, INITIAL ENCOUNTER: ICD-10-CM

## 2022-03-08 ENCOUNTER — RX RENEWAL (OUTPATIENT)
Age: 72
End: 2022-03-08

## 2022-03-13 ENCOUNTER — OUTPATIENT (OUTPATIENT)
Dept: OUTPATIENT SERVICES | Facility: HOSPITAL | Age: 72
LOS: 1 days | Discharge: HOME | End: 2022-03-13

## 2022-03-13 DIAGNOSIS — Z85.51 PERSONAL HISTORY OF MALIGNANT NEOPLASM OF BLADDER: Chronic | ICD-10-CM

## 2022-03-13 DIAGNOSIS — Z98.890 OTHER SPECIFIED POSTPROCEDURAL STATES: Chronic | ICD-10-CM

## 2022-03-13 DIAGNOSIS — D47.1 CHRONIC MYELOPROLIFERATIVE DISEASE: ICD-10-CM

## 2022-03-15 ENCOUNTER — APPOINTMENT (OUTPATIENT)
Dept: HEMATOLOGY ONCOLOGY | Facility: CLINIC | Age: 72
End: 2022-03-15
Payer: MEDICARE

## 2022-03-15 ENCOUNTER — APPOINTMENT (OUTPATIENT)
Dept: ORTHOPEDIC SURGERY | Facility: CLINIC | Age: 72
End: 2022-03-15
Payer: MEDICARE

## 2022-03-15 ENCOUNTER — NON-APPOINTMENT (OUTPATIENT)
Age: 72
End: 2022-03-15

## 2022-03-15 VITALS
SYSTOLIC BLOOD PRESSURE: 122 MMHG | OXYGEN SATURATION: 99 % | HEART RATE: 76 BPM | RESPIRATION RATE: 15 BRPM | TEMPERATURE: 97.9 F | WEIGHT: 201 LBS | HEIGHT: 68 IN | DIASTOLIC BLOOD PRESSURE: 72 MMHG | BODY MASS INDEX: 30.46 KG/M2

## 2022-03-15 VITALS
WEIGHT: 198 LBS | BODY MASS INDEX: 30.01 KG/M2 | DIASTOLIC BLOOD PRESSURE: 73 MMHG | HEART RATE: 83 BPM | HEIGHT: 68 IN | SYSTOLIC BLOOD PRESSURE: 136 MMHG

## 2022-03-15 PROCEDURE — 73130 X-RAY EXAM OF HAND: CPT | Mod: RT

## 2022-03-15 PROCEDURE — 99214 OFFICE O/P EST MOD 30 MIN: CPT

## 2022-03-15 PROCEDURE — 99203 OFFICE O/P NEW LOW 30 MIN: CPT

## 2022-03-15 NOTE — ADDENDUM
[FreeTextEntry1] : I, Erin Garcia wrote this note acting as a scribe for Dr. Madhu Gtz on Mar 15, 2022.

## 2022-03-15 NOTE — PHYSICAL EXAM
[Fully active, able to carry on all pre-disease performance without restriction] : Status 0 - Fully active, able to carry on all pre-disease performance without restriction [Normal] : affect appropriate [de-identified] : non-toxic appearing [de-identified] : soft, NT; ?spleen tip palpable LUQ; +ventral hernia, NT

## 2022-03-15 NOTE — HISTORY OF PRESENT ILLNESS
[Right] : right hand dominant [FreeTextEntry1] : 71 year old RHD male presents complaining of right index and thumb pain and swelling. He fell about 2 weeks ago and braced himself against his car to prevent him from hitting the ground. He recalls immediate pain and significant swelling. About 2 days later, he noticed yellow spots on the distal phalanx of his index finger. It has become discolored and the skin has been peeling around it. It is quite swollen in the area, as well. He complains of pain at the base of his thumb. He denies numbness and tingling. He is unable to grab or pinch his thumb and index finger. He had xrays of the right hand on 03/05/22, which revealed Joint space narrowing of the DIP joint space of the right second digit is noted, with soft tissue swelling noted in that region. There is irregular ossification noted along both dorsal and volar aspects of the DIP joint space raising suspicion for avulsion fracture deformities. He was advised to ice and use a sling. \par \par He has a history of Kidney disease.

## 2022-03-15 NOTE — DISCUSSION/SUMMARY
[FreeTextEntry1] : The underlying pathophysiology was reviewed with the patient. XR films were reviewed with the patient. Discussed at length the nature of the patient’s condition. \par \par With regard to the swelling and stiffness to the hand, he was given the option of hand therapy which he deferred. I did tell him to soak the hand in warm water and Epsom salts and then actively as well as passively perform ROM exercises. \par \par With regard to the right index finger, the finger was splinted at the DIP joint, with the DIP joint in extension. I did tell him that if gouty appearance does not resolve with time, surgical management may be indicated to remove the gouty crystals. He was advised to monitor for signs of infection. Finally, I am recommending he begin a course of antibiotics.\par I would recommend he follow up with his PCP, DR. Interiano to have his Uric Acid levels measured. \par RX: Augmentin 875mg (20 tablets)\par \par I did recommend he no longer use a sling or at the very least intermittently remove it so he does not develop stiffness to the elbow and shoulder. \par \par All questions answered, understanding verbalized. Patient in agreement with plan of care.  Follow up in 2 weeks.

## 2022-03-15 NOTE — HISTORY OF PRESENT ILLNESS
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 Flow cytometry of the bone marrow aspirate lymphocytes showed no diagnostic abnormalities. Reticulin stain showed a slight increase and trichrome stain was negative for collagen (grade 1 fibrosis). Bone marrow aspirate iron stain showed iron stores present with ringed sideroblasts (less than 15% of cells). Quantitative BCR-ABL by RT-PCR negative. Cytogenetics showed a normal male karyotype. MDS FISH panel negative. Maintained on Hydrea therapy.\par \par History of renal cancer 10/2014-stage I(pT1a), status post right partial nephrectomy\par \par History of bladder cancer (noninvasive) 1994- status post TURBT. [de-identified] : S/P right hand fracture.\par Saw nephrologist Dr. Calderon recently. \par Sees cardiologist for BP management. "Failed" ETT. Went to [facility]. Has catheterization scheduled with Dr. Puckett later this month.\par S/P fulguration additional bladder tumors. Has f/u planned again in 4 months ([facility]).\par No fevers.\par No complaints of headache, pruritus, chest pain, shortness of breath, abdominal/pelvic pain. No cough. No bleeding.\par \par Got COVID vaccines (Pfizer) x3.

## 2022-03-15 NOTE — PHYSICAL EXAM
[de-identified] : Patient is WDWN, alert, and in no acute distress. Breathing is unlabored. He is grossly oriented to person, place, and time.\par \par Right Hand:\par Redness and swelling proximally to the nail bed of the right index finger. Skin appears to peeling. 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\par Radiating pain from the thumb to the index and middle fingers\par Limitations of digital motion noted\par Sensation to the digit is normal [de-identified] : AP, lateral and oblique views of the RIGHT hand were obtained today and revealed arthritis to the DIP joints of the index and middle fingers as well as a calcium deposit noted to the IP joint of the right thumb\par \par ------------------------------------------------------------------------------------------------------------------------------------------------------------------------------------\par \par EXAM: XR HAND MIN 3 VIEWS RT\par PROCEDURE DATE: 03/05/2022\par IMPRESSION: \par Joint space narrowing of the DIP joint space of the right second digit is noted, with soft tissue swelling noted in that region. There is irregular ossification noted along both dorsal and volar aspects of the DIP joint space raising suspicion for avulsion fracture deformities.\par \par NARAYAN SILVA MD; Attending Radiologist\par This document has been electronically signed. Mar 5 2022 2:26PM

## 2022-03-15 NOTE — ASSESSMENT
[FreeTextEntry1] : Lab work reviewed.\par Myeloproliferative disorder with thrombocytosis/leukocytosis-clinically stable at present. Patient will continue with current Hydrea dosing. He also remains on aspirin 81 mg daily.\par PO fluid hydration as tolerated.\par \par Patient continues under urologic care at [facility] [place] for his  malignancy. He is to continue follow-up with nephrologist for his kidney disease as well.\par \par Patient was given the opportunity to ask questions. His questions have been answered to his apparent satisfaction at this time. He is agreeable to recommended follow-up. \par

## 2022-04-15 ENCOUNTER — RX RENEWAL (OUTPATIENT)
Age: 72
End: 2022-04-15

## 2022-04-19 ENCOUNTER — APPOINTMENT (OUTPATIENT)
Dept: ORTHOPEDIC SURGERY | Facility: CLINIC | Age: 72
End: 2022-04-19
Payer: MEDICARE

## 2022-04-19 DIAGNOSIS — S60.221D CONTUSION OF RIGHT HAND, SUBSEQUENT ENCOUNTER: ICD-10-CM

## 2022-04-19 PROCEDURE — 99213 OFFICE O/P EST LOW 20 MIN: CPT

## 2022-04-19 NOTE — DISCUSSION/SUMMARY
[FreeTextEntry1] : The underlying pathophysiology was reviewed with the patient. XR films were reviewed with the patient. Discussed at length the nature of the patient’s condition. \par \par With regard to the swelling and stiffness to the hand, he was given the option of hand therapy which he deferred. I did tell him to soak the hand in warm water and Epsom salts and then actively as well as passively perform ROM exercises. \par \par With regard to the right index finger, the DIP joint which was splinted, was removed. THere is an obvious lack of motion and I did offer him the option of hard therapy which he deferred in lieu of an at home exercise program. Additionally, the right index finger was sophia taped to the right middle finger and he was advised on passive flexion and extension. He was advised to soak the hand in warm water and Epsom salts.\par \par All questions answered, understanding verbalized. Patient in agreement with plan of care. Follow up in 4 weeks.

## 2022-04-19 NOTE — PHYSICAL EXAM
[de-identified] : Patient is WDWN, alert, and in no acute distress. Breathing is unlabored. He is grossly oriented to person, place, and time.\par \par Right Hand:\par Redness and swelling proximally to the nail bed of the right index finger. Skin appears to peeling. 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\par Radiating pain from the thumb to the index and middle fingers\par Limitations of digital motion noted\par Sensation to the digit is normal [de-identified] : AP, lateral and oblique views of the RIGHT hand were obtained today and revealed arthritis to the DIP joints of the index and middle fingers as well as a calcium deposit noted to the IP joint of the right thumb\par \par ------------------------------------------------------------------------------------------------------------------------------------------------------------------------------------\par \par EXAM: XR HAND MIN 3 VIEWS RT\par PROCEDURE DATE: 03/05/2022\par IMPRESSION: \par Joint space narrowing of the DIP joint space of the right second digit is noted, with soft tissue swelling noted in that region. There is irregular ossification noted along both dorsal and volar aspects of the DIP joint space raising suspicion for avulsion fracture deformities.\par \par NARAYAN SILVA MD; Attending Radiologist\par This document has been electronically signed. Mar 5 2022 2:26PM

## 2022-04-19 NOTE — END OF VISIT
[FreeTextEntry3] : All medical record entries made by the Scribe were at my,  Dr. Madhu Gtz MD., direction and personally dictated by me on 04/19/2022. I have personally reviewed the chart and agree that the record accurately reflects my personal performance of the history, physical exam, assessment and plan.

## 2022-04-19 NOTE — HISTORY OF PRESENT ILLNESS
[Right] : right hand dominant [FreeTextEntry1] : 71 year old RHD male presents complaining of right index and thumb pain and swelling. He fell and braced himself against his car to prevent him from hitting the ground. He recalls immediate pain and significant swelling. About 2 days later, he noticed yellow spots on the distal phalanx of his index finger. It has become discolored and the skin has been peeling around it. It is quite swollen in the area, as well. He complains of pain at the base of his thumb. He denies numbness and tingling. He is unable to grab or pinch his thumb and index finger. He had xrays of the right hand on 03/05/22, which revealed Joint space narrowing of the DIP joint space of the right second digit is noted, with soft tissue swelling noted in that region. There is irregular ossification noted along both dorsal and volar aspects of the DIP joint space raising suspicion for avulsion fracture deformities. He was advised to ice and use a sling. He returns in follow up on 4/19/22 and reports continued swelling to the hand, most notably over the MCP joint of the index finger. He states he cannot take NSAIDs but does take Tylenol at times for pain, although it is not helpful. He completed the course of Augmentin as prescribed to him by me at his last office visit on 3/15/22. \par \par He has a history of Kidney disease.

## 2022-05-03 ENCOUNTER — APPOINTMENT (OUTPATIENT)
Dept: FAMILY MEDICINE | Facility: CLINIC | Age: 72
End: 2022-05-03
Payer: MEDICARE

## 2022-05-03 ENCOUNTER — LABORATORY RESULT (OUTPATIENT)
Age: 72
End: 2022-05-03

## 2022-05-03 VITALS
OXYGEN SATURATION: 98 % | HEART RATE: 77 BPM | SYSTOLIC BLOOD PRESSURE: 165 MMHG | WEIGHT: 185 LBS | RESPIRATION RATE: 16 BRPM | HEIGHT: 68 IN | BODY MASS INDEX: 28.04 KG/M2 | TEMPERATURE: 97.9 F | DIASTOLIC BLOOD PRESSURE: 78 MMHG

## 2022-05-03 DIAGNOSIS — R94.39 ABNORMAL RESULT OF OTHER CARDIOVASCULAR FUNCTION STUDY: ICD-10-CM

## 2022-05-03 PROCEDURE — 99215 OFFICE O/P EST HI 40 MIN: CPT

## 2022-05-03 NOTE — HISTORY OF PRESENT ILLNESS
[FreeTextEntry1] : SeeI [de-identified] : Patient is a 71-year-old gentleman who presents today for follow-up and disease management patient recently seen by cardiology status post stress test stress test was abnormal cardiac catheterization done patient has 2 drug-eluting stents which were placed right coronary artery and left circumflex.  Medical history is significant for hyperlipidemia, chronic renal insufficiency stage III 8, myeloproliferative disorder, partial nephrectomy secondary to renal cell CA, generalized anxiety disorder well-controlled and difficult to control hypertension.

## 2022-05-03 NOTE — HEALTH RISK ASSESSMENT
[Former] : Former [20 or more] : 20 or more [No] : In the past 12 months have you used drugs other than those required for medical reasons? No [0] : 2) Feeling down, depressed, or hopeless: Not at all (0) [PHQ-2 Negative - No further assessment needed] : PHQ-2 Negative - No further assessment needed [YearQuit] : 1994 [Audit-CScore] : 0 [TDE1Zucmx] : 0

## 2022-05-03 NOTE — ASSESSMENT
[FreeTextEntry1] : Assessment and plan:\par \par 1.  Hypertension which has been difficult to control patient is on multiple medications unfortunately patient also has underlying chronic renal insufficiency stage III a we will attempt olmesartan 20 mg p.o. daily surveilling kidney functions closely.\par \par 2.  Status post stress test abnormal, status post cardiac catheterization patient has 2 drug-eluting stents placed 1 in the RCA and the other in the left circumflex presently patient is on clopidogrel 75 mg p.o. daily.\par \par 3.  Hyperlipidemia continue low-fat low-cholesterol diet and atorvastatin.\par \par 4.  Chronic kidney disease stage IIIa avoid all nephrotoxic medications especially nonsteroidal anti-inflammatory drugs I also recommend good hydration.  Patient will be following up with nephrology within this month.\par \par 5.  Myeloproliferative disease patient is followed very closely by hematology oncology presently on hydroxyurea.\par \par 6.  History of  malignancy status post partial nephrectomy patient is followed at [facility].\par \par 7.  Generalized anxiety well controlled with Klonopin 1 mg patient takes medications very sparingly and only as needed.\par \par Reviewed with patient stress test, and all medications.  Medications that needed to be refilled sent to pharmacy.\par \par Total time spent face-to-face and nonface-to-face time 45 minutes.

## 2022-05-03 NOTE — REVIEW OF SYSTEMS
[Fatigue] : fatigue [Joint Pain] : joint pain [Joint Stiffness] : joint stiffness [Joint Swelling] : joint swelling [Negative] : Heme/Lymph [FreeTextEntry9] : Status post accidental fall contusion of right hand hand is swollen tender to touch index finger immobile.

## 2022-05-03 NOTE — PHYSICAL EXAM
[No Acute Distress] : no acute distress [Well Nourished] : well nourished [Well Developed] : well developed [Well-Appearing] : well-appearing [Normal Voice/Communication] : normal voice/communication [Normal Sclera/Conjunctiva] : normal sclera/conjunctiva [PERRL] : pupils equal round and reactive to light [EOMI] : extraocular movements intact [Normal Outer Ear/Nose] : the outer ears and nose were normal in appearance [Normal TMs] : both tympanic membranes were normal [No JVD] : no jugular venous distention [No Lymphadenopathy] : no lymphadenopathy [Thyroid Normal, No Nodules] : the thyroid was normal and there were no nodules present [No Respiratory Distress] : no respiratory distress  [No Accessory Muscle Use] : no accessory muscle use [Clear to Auscultation] : lungs were clear to auscultation bilaterally [Normal Rate] : normal rate  [Regular Rhythm] : with a regular rhythm [Normal S1, S2] : normal S1 and S2 [No Murmur] : no murmur heard [No Carotid Bruits] : no carotid bruits [No Abdominal Bruit] : a ~M bruit was not heard ~T in the abdomen [No Varicosities] : no varicosities [Pedal Pulses Present] : the pedal pulses are present [No Edema] : there was no peripheral edema [No Palpable Aorta] : no palpable aorta [No Extremity Clubbing/Cyanosis] : no extremity clubbing/cyanosis [Soft] : abdomen soft [Non Tender] : non-tender [Non-distended] : non-distended [No Masses] : no abdominal mass palpated [No HSM] : no HSM [Normal Bowel Sounds] : normal bowel sounds [Normal Posterior Cervical Nodes] : no posterior cervical lymphadenopathy [Normal Anterior Cervical Nodes] : no anterior cervical lymphadenopathy [No CVA Tenderness] : no CVA  tenderness [No Spinal Tenderness] : no spinal tenderness [No Rash] : no rash [Coordination Grossly Intact] : coordination grossly intact [No Focal Deficits] : no focal deficits [Normal Gait] : normal gait [Deep Tendon Reflexes (DTR)] : deep tendon reflexes were 2+ and symmetric [Speech Grossly Normal] : speech grossly normal [Memory Grossly Normal] : memory grossly normal [Normal Affect] : the affect was normal [Alert and Oriented x3] : oriented to person, place, and time [Normal Mood] : the mood was normal [Normal Insight/Judgement] : insight and judgment were intact [de-identified] : Right hand swollen index finger immobile

## 2022-05-04 ENCOUNTER — NON-APPOINTMENT (OUTPATIENT)
Age: 72
End: 2022-05-04

## 2022-05-04 LAB
BASOPHILS # BLD AUTO: 0.57 K/UL
BASOPHILS NFR BLD AUTO: 3.5 %
EOSINOPHIL # BLD AUTO: 0.28 K/UL
EOSINOPHIL NFR BLD AUTO: 1.7 %
HCT VFR BLD CALC: 44.2 %
HGB BLD-MCNC: 14 G/DL
LYMPHOCYTES # BLD AUTO: 1.44 K/UL
LYMPHOCYTES NFR BLD AUTO: 8.8 %
MAN DIFF?: NORMAL
MCHC RBC-ENTMCNC: 31.7 GM/DL
MCHC RBC-ENTMCNC: 34.1 PG
MCV RBC AUTO: 107.8 FL
MONOCYTES # BLD AUTO: 0.72 K/UL
MONOCYTES NFR BLD AUTO: 4.4 %
NEUTROPHILS # BLD AUTO: 12.52 K/UL
NEUTROPHILS NFR BLD AUTO: 76.3 %
PLATELET # BLD AUTO: 613 K/UL
RBC # BLD: 4.1 M/UL
RBC # FLD: 17.2 %
WBC # FLD AUTO: 16.41 K/UL

## 2022-05-12 ENCOUNTER — LABORATORY RESULT (OUTPATIENT)
Age: 72
End: 2022-05-12

## 2022-05-12 LAB
ALBUMIN SERPL ELPH-MCNC: 4.8 G/DL
ALP BLD-CCNC: 81 U/L
ALT SERPL-CCNC: 47 U/L
AST SERPL-CCNC: 30 U/L
BASOPHILS # BLD AUTO: 0.28 K/UL
BASOPHILS NFR BLD AUTO: 1.9 %
BILIRUB DIRECT SERPL-MCNC: 0.3 MG/DL
BILIRUB INDIRECT SERPL-MCNC: 1.5 MG/DL
BILIRUB SERPL-MCNC: 1.8 MG/DL
CREAT SERPL-MCNC: 1.14 MG/DL
EGFR: 68 ML/MIN/1.73M2
EOSINOPHIL # BLD AUTO: 0.3 K/UL
EOSINOPHIL NFR BLD AUTO: 2 %
HCT VFR BLD CALC: 40.8 %
HGB BLD-MCNC: 13.1 G/DL
IMM GRANULOCYTES NFR BLD AUTO: 5.4 %
LYMPHOCYTES # BLD AUTO: 1.59 K/UL
LYMPHOCYTES NFR BLD AUTO: 10.8 %
MAN DIFF?: NORMAL
MCHC RBC-ENTMCNC: 32.1 GM/DL
MCHC RBC-ENTMCNC: 34.5 PG
MCV RBC AUTO: 107.4 FL
MONOCYTES # BLD AUTO: 0.69 K/UL
MONOCYTES NFR BLD AUTO: 4.7 %
NEUTROPHILS # BLD AUTO: 11.07 K/UL
NEUTROPHILS NFR BLD AUTO: 75.2 %
PLATELET # BLD AUTO: 492 K/UL
PROT SERPL-MCNC: 6.8 G/DL
RBC # BLD: 3.8 M/UL
RBC # FLD: 17.4 %
WBC # FLD AUTO: 14.72 K/UL

## 2022-05-31 ENCOUNTER — APPOINTMENT (OUTPATIENT)
Dept: ORTHOPEDIC SURGERY | Facility: CLINIC | Age: 72
End: 2022-05-31

## 2022-06-10 LAB
ALBUMIN SERPL ELPH-MCNC: 5.1 G/DL
ALP BLD-CCNC: 75 U/L
ALT SERPL-CCNC: 25 U/L
AST SERPL-CCNC: 23 U/L
BILIRUB DIRECT SERPL-MCNC: 0.1 MG/DL
BILIRUB INDIRECT SERPL-MCNC: 0.3 MG/DL
BILIRUB SERPL-MCNC: 0.4 MG/DL
PROT SERPL-MCNC: 7.1 G/DL

## 2022-06-27 ENCOUNTER — APPOINTMENT (OUTPATIENT)
Dept: NEPHROLOGY | Facility: CLINIC | Age: 72
End: 2022-06-27

## 2022-06-27 VITALS
OXYGEN SATURATION: 100 % | HEIGHT: 68 IN | TEMPERATURE: 97.3 F | SYSTOLIC BLOOD PRESSURE: 136 MMHG | WEIGHT: 194 LBS | BODY MASS INDEX: 29.4 KG/M2 | DIASTOLIC BLOOD PRESSURE: 67 MMHG | HEART RATE: 63 BPM

## 2022-06-27 PROCEDURE — 99213 OFFICE O/P EST LOW 20 MIN: CPT

## 2022-06-27 RX ORDER — AMOXICILLIN AND CLAVULANATE POTASSIUM 875; 125 MG/1; MG/1
875-125 TABLET, COATED ORAL
Qty: 20 | Refills: 0 | Status: DISCONTINUED | COMMUNITY
Start: 2022-03-15 | End: 2022-06-27

## 2022-06-27 RX ORDER — NIACIN 250 MG
250 TABLET ORAL DAILY
Qty: 90 | Refills: 3 | Status: DISCONTINUED | COMMUNITY
Start: 2022-05-03 | End: 2022-06-27

## 2022-06-27 NOTE — HISTORY OF PRESENT ILLNESS
[FreeTextEntry1] : Today I had the pleasure of seeing Drew Morales he was born and raised in [place].  He used to live near [facility] and is a big car and baseball fan.  He now is retired and lives out on [address].  He has had a prior history of partial nephrectomy and hypertension for which he followed with Dr. Ortega.  On presentation today he feels great.  He has no complaints.  He is not SOB he has no NVD.  Urinating is ok.  Sometimes he has a little incontinence but he does not have a sensation of incomplete voiding.  He has recently had some cervical spinal issues and was taking mobic but his creatinine and bp have been stable.\par \par 10/18/21 -- Drew is here to see me today after about 2 years due to the pandemic.  He was recently told to come back and see me because of his creatinine, blood pressure, and potassium.  He reports that he continues to follow up with his hematologist for thrombocytosis, continues to follow up with internal medicine.  He recently established care with a cardiologist for erractic blood presure.  He reports that at home his blood pressure is often 160 to 190.  He was started on amlodipine last month and it seems to work but BP still elevated.  He reports a poor diet which consists of eating out daily frequently at fast food restaurants.  No palpitations no SOB.\par \par 1/6/21 -- Saw Drew today and recent developments have included addition of metoprolol by Patricia Dill for management of CAD, bladder polyps recurrence and is following with [facility].  He describes no issues with urination no chest pain no shortness of breath but is overall very anxious about his heart and about the findings on his bladder.\par \par 6/27/22 -- Saw Drew today had cardiac cath since last visit was done in march and repeat blood work since then was ok.  Follows up with [facility] for prior malignancy, follows up with Laisha and babatunde as well as [facility] for myeloprol disorder.  BP was elevated recently and internist added olmesartan with subsequent improvement. SBP at home mostly 120.

## 2022-06-27 NOTE — PHYSICAL EXAM
[General Appearance - Alert] : alert [General Appearance - In No Acute Distress] : in no acute distress [General Appearance - Well Nourished] : well nourished [Sclera] : the sclera and conjunctiva were normal [Hearing Threshold Finger Rub Not [place]] : hearing was normal [Neck Appearance] : the appearance of the neck was normal [Neck Cervical Mass (___cm)] : no neck mass was observed [Jugular Venous Distention Increased] : there was no jugular-venous distention [Respiration, Rhythm And Depth] : normal respiratory rhythm and effort [Exaggerated Use Of Accessory Muscles For Inspiration] : no accessory muscle use [Auscultation Breath Sounds / Voice Sounds] : lungs were clear to auscultation bilaterally [Heart Sounds] : normal S1 and S2 [Heart Sounds Gallop] : no gallops [Murmurs] : no murmurs [Heart Sounds Pericardial Friction Rub] : no pericardial rub [Edema] : there was no peripheral edema [Abdomen Soft] : soft [Abdomen Tenderness] : non-tender [Abdomen Mass (___ Cm)] : no abdominal mass palpated [] : no rash [Oriented To Time, Place, And Person] : oriented to person, place, and time [Impaired Insight] : insight and judgment were intact [Affect] : the affect was normal [Mood] : the mood was normal

## 2022-06-27 NOTE — REVIEW OF SYSTEMS
[Fever] : no fever [Chills] : no chills [Feeling Poorly] : not feeling poorly [Feeling Tired] : not feeling tired [Eyesight Problems] : no eyesight problems [Nosebleeds] : no nosebleeds [Nasal Discharge] : no nasal discharge [Chest Pain] : no chest pain [Palpitations] : no palpitations [Leg Claudication] : no intermittent leg claudication [Lower Ext Edema] : no extremity edema [Shortness Of Breath] : no shortness of breath [Abdominal Pain] : no abdominal pain [Vomiting] : no vomiting [As Noted in HPI] : as noted in HPI [Arthralgias] : no arthralgias [Joint Swelling] : no joint swelling [Joint Stiffness] : no joint stiffness [Dizziness] : no dizziness [Fainting] : no fainting [Anxiety] : no anxiety [Depression] : no depression [Easy Bleeding] : a tendency for easy bleeding [Easy Bruising] : no tendency for easy bruising

## 2022-06-27 NOTE — ASSESSMENT
[FreeTextEntry1] : 71 years old man with mild CKD here for follow up evaluation.\par \par Chronic Kidney Disease Stage IIIa -- Secondary to hypertension and partial nephrectomy.  I have reviewed the notes from [facility] including the most recent creatinine which was 1.14 in early June and provided my independent assessment that this is stable CKD.  Last creatinine was 1.1. We spent most of the visit discussing chronic kidney disease, its stages, risk for progression and strategies for prevention including avoidance of NSAIDs and notifying me of medication changes.  Since our last visit had contrast exposure and titration of ARB therefore repeat renal panel to check creatinine and potassium is warranted.  Today we spoke about the role of olmesartan on creatinine.  I also counseled the patient on the importance of a "medication holiday" during periods of stress / dehydration\par \par Hypertension -- The patient's blood pressure is at goal.   BP at home is approximately 120.  On CT abdomen with contrast no note made of GLENN.  Will continue to monitor\par \par f/u in 6 to 9 months.

## 2022-06-28 LAB
ALBUMIN SERPL ELPH-MCNC: 5 G/DL
ANION GAP SERPL CALC-SCNC: 14 MMOL/L
BUN SERPL-MCNC: 18 MG/DL
CALCIUM SERPL-MCNC: 9.4 MG/DL
CHLORIDE SERPL-SCNC: 105 MMOL/L
CO2 SERPL-SCNC: 25 MMOL/L
CREAT SERPL-MCNC: 1.05 MG/DL
EGFR: 75 ML/MIN/1.73M2
GLUCOSE SERPL-MCNC: 99 MG/DL
PHOSPHATE SERPL-MCNC: 3.4 MG/DL
POTASSIUM SERPL-SCNC: 4.8 MMOL/L
POTASSIUM SERPL-SCNC: 5 MMOL/L
SODIUM SERPL-SCNC: 144 MMOL/L

## 2022-07-14 ENCOUNTER — OUTPATIENT (OUTPATIENT)
Dept: OUTPATIENT SERVICES | Facility: HOSPITAL | Age: 72
LOS: 1 days | Discharge: HOME | End: 2022-07-14

## 2022-07-14 DIAGNOSIS — Z85.51 PERSONAL HISTORY OF MALIGNANT NEOPLASM OF BLADDER: Chronic | ICD-10-CM

## 2022-07-14 DIAGNOSIS — D47.1 CHRONIC MYELOPROLIFERATIVE DISEASE: ICD-10-CM

## 2022-07-14 DIAGNOSIS — Z98.890 OTHER SPECIFIED POSTPROCEDURAL STATES: Chronic | ICD-10-CM

## 2022-07-22 ENCOUNTER — LABORATORY RESULT (OUTPATIENT)
Age: 72
End: 2022-07-22

## 2022-07-23 LAB
ALBUMIN SERPL ELPH-MCNC: 5.1 G/DL
ALP BLD-CCNC: 61 U/L
ALT SERPL-CCNC: 44 U/L
AST SERPL-CCNC: 28 U/L
BASOPHILS # BLD AUTO: 0 K/UL
BASOPHILS NFR BLD AUTO: 0 %
BILIRUB DIRECT SERPL-MCNC: 0.1 MG/DL
BILIRUB INDIRECT SERPL-MCNC: 0.4 MG/DL
BILIRUB SERPL-MCNC: 0.5 MG/DL
CREAT SERPL-MCNC: 1.34 MG/DL
EGFR: 56 ML/MIN/1.73M2
EOSINOPHIL # BLD AUTO: 0.28 K/UL
EOSINOPHIL NFR BLD AUTO: 2.6 %
HCT VFR BLD CALC: 46 %
HGB BLD-MCNC: 14.6 G/DL
LYMPHOCYTES # BLD AUTO: 1.3 K/UL
LYMPHOCYTES NFR BLD AUTO: 12.2 %
MAN DIFF?: NORMAL
MCHC RBC-ENTMCNC: 31.7 GM/DL
MCHC RBC-ENTMCNC: 34.2 PG
MCV RBC AUTO: 107.7 FL
MONOCYTES # BLD AUTO: 0.74 K/UL
MONOCYTES NFR BLD AUTO: 6.9 %
NEUTROPHILS # BLD AUTO: 8.17 K/UL
NEUTROPHILS NFR BLD AUTO: 76.5 %
PLATELET # BLD AUTO: 418 K/UL
PROT SERPL-MCNC: 7.2 G/DL
RBC # BLD: 4.27 M/UL
RBC # FLD: 14.8 %
WBC # FLD AUTO: 10.68 K/UL

## 2022-07-26 ENCOUNTER — APPOINTMENT (OUTPATIENT)
Dept: HEMATOLOGY ONCOLOGY | Facility: CLINIC | Age: 72
End: 2022-07-26

## 2022-07-26 VITALS
WEIGHT: 190.7 LBS | TEMPERATURE: 97.4 F | HEART RATE: 76 BPM | BODY MASS INDEX: 31.39 KG/M2 | RESPIRATION RATE: 16 BRPM | DIASTOLIC BLOOD PRESSURE: 70 MMHG | OXYGEN SATURATION: 99 % | HEIGHT: 65.35 IN | SYSTOLIC BLOOD PRESSURE: 117 MMHG

## 2022-07-26 PROCEDURE — 99214 OFFICE O/P EST MOD 30 MIN: CPT

## 2022-07-26 RX ORDER — PREDNISONE 20 MG/1
20 TABLET ORAL
Qty: 4 | Refills: 0 | Status: DISCONTINUED | COMMUNITY
Start: 2022-03-09

## 2022-07-26 NOTE — ASSESSMENT
[FreeTextEntry1] : Lab work reviewed.\par Myeloproliferative disorder with thrombocytosis/leukocytosis-clinically stable at present. Continue with current Hydrea dosing. Patient also remains on aspirin 81 mg daily.\par PO fluid hydration as tolerated.\par \par Patient continues under urologic care at [facility] [place] for his  malignancy. He is to continue follow-up with nephrologist for his kidney disease as well.\par \par Patient was given the opportunity to ask questions. His questions have been answered to his apparent satisfaction at this time. He is agreeable to recommended follow-up. \par

## 2022-07-26 NOTE — PHYSICAL EXAM
[Fully active, able to carry on all pre-disease performance without restriction] : Status 0 - Fully active, able to carry on all pre-disease performance without restriction [Normal] : affect appropriate [de-identified] : non-toxic appearing [de-identified] : soft, NT; spleen tip palpable LUQ; +ventral hernia, NT

## 2022-07-26 NOTE — HISTORY OF PRESENT ILLNESS
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 Flow cytometry of the bone marrow aspirate lymphocytes showed no diagnostic abnormalities. Reticulin stain showed a slight increase and trichrome stain was negative for collagen (grade 1 fibrosis). Bone marrow aspirate iron stain showed iron stores present with ringed sideroblasts (less than 15% of cells). Quantitative BCR-ABL by RT-PCR negative. Cytogenetics showed a normal male karyotype. MDS FISH panel negative. Maintained on Hydrea therapy.\par \par History of renal cancer 10/2014-stage I(pT1a), status post right partial nephrectomy\par \par History of bladder cancer (noninvasive) 1994- status post TURBT. [de-identified] : Sees nephrologist Dr. Calderon, for kidney disease. \par Sees cardiologist for BP management. \par Follows regularly with urologist for bladder tumors ([facility]).\par No fevers.\par No complaints of headache, pruritus, chest pain, shortness of breath, abdominal/pelvic pain. No cough. No bleeding.\par Sad his daughter moved to [place].\par \par Has had COVID vaccines (Pfizer) x 3.

## 2022-07-31 ENCOUNTER — RX RENEWAL (OUTPATIENT)
Age: 72
End: 2022-07-31

## 2022-09-21 ENCOUNTER — RX RENEWAL (OUTPATIENT)
Age: 72
End: 2022-09-21

## 2022-09-27 LAB
BASOPHILS # BLD AUTO: 0.28 K/UL
BASOPHILS NFR BLD AUTO: 2.1 %
EOSINOPHIL # BLD AUTO: 0.19 K/UL
EOSINOPHIL NFR BLD AUTO: 1.4 %
HCT VFR BLD CALC: 42.7 %
HGB BLD-MCNC: 13.5 G/DL
IMM GRANULOCYTES NFR BLD AUTO: 4.6 %
LYMPHOCYTES # BLD AUTO: 1.13 K/UL
LYMPHOCYTES NFR BLD AUTO: 8.6 %
MAN DIFF?: NORMAL
MCHC RBC-ENTMCNC: 31.6 GM/DL
MCHC RBC-ENTMCNC: 33.3 PG
MCV RBC AUTO: 105.2 FL
MONOCYTES # BLD AUTO: 0.67 K/UL
MONOCYTES NFR BLD AUTO: 5.1 %
NEUTROPHILS # BLD AUTO: 10.31 K/UL
NEUTROPHILS NFR BLD AUTO: 78.2 %
PLATELET # BLD AUTO: 463 K/UL
RBC # BLD: 4.06 M/UL
RBC # FLD: 16.4 %
WBC # FLD AUTO: 13.19 K/UL

## 2022-10-25 ENCOUNTER — OUTPATIENT (OUTPATIENT)
Dept: OUTPATIENT SERVICES | Facility: HOSPITAL | Age: 72
LOS: 1 days | Discharge: HOME | End: 2022-10-25

## 2022-10-25 DIAGNOSIS — D47.1 CHRONIC MYELOPROLIFERATIVE DISEASE: ICD-10-CM

## 2022-10-25 DIAGNOSIS — Z98.890 OTHER SPECIFIED POSTPROCEDURAL STATES: Chronic | ICD-10-CM

## 2022-10-25 DIAGNOSIS — Z85.51 PERSONAL HISTORY OF MALIGNANT NEOPLASM OF BLADDER: Chronic | ICD-10-CM

## 2022-11-01 ENCOUNTER — APPOINTMENT (OUTPATIENT)
Dept: HEMATOLOGY ONCOLOGY | Facility: CLINIC | Age: 72
End: 2022-11-01

## 2022-11-01 VITALS
WEIGHT: 200.62 LBS | TEMPERATURE: 97.7 F | HEART RATE: 80 BPM | OXYGEN SATURATION: 99 % | RESPIRATION RATE: 16 BRPM | DIASTOLIC BLOOD PRESSURE: 70 MMHG | HEIGHT: 65.31 IN | SYSTOLIC BLOOD PRESSURE: 153 MMHG | BODY MASS INDEX: 33.02 KG/M2

## 2022-11-01 PROCEDURE — 99214 OFFICE O/P EST MOD 30 MIN: CPT

## 2022-11-01 RX ORDER — METHYLPREDNISOLONE 4 MG/1
4 TABLET ORAL
Qty: 21 | Refills: 0 | Status: DISCONTINUED | COMMUNITY
Start: 2022-10-24

## 2022-11-01 NOTE — ASSESSMENT
[FreeTextEntry1] : Lab work reviewed.\par Myeloproliferative disorder with thrombocytosis/leukocytosis-clinically stable at present. Continue with current Hydrea dosing-patient appears to tolerate. Patient also remains on aspirin 81 mg daily.\par Interval f/u lab work to be done.\par PO fluid hydration as tolerated.\par \par Patient continues under urologic care at [facility] for his  malignancy. He is to continue follow-up with nephrologist for his kidney disease/h/o hyperkalemia, as well.\par \par Patient was given the opportunity to ask questions. His questions have been answered to his apparent satisfaction at this time. He is agreeable to recommended follow-up. \par

## 2022-11-01 NOTE — HISTORY OF PRESENT ILLNESS
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 Flow cytometry of the bone marrow aspirate lymphocytes showed no diagnostic abnormalities. Reticulin stain showed a slight increase and trichrome stain was negative for collagen (grade 1 fibrosis). Bone marrow aspirate iron stain showed iron stores present with ringed sideroblasts (less than 15% of cells). Quantitative BCR-ABL by RT-PCR negative. Cytogenetics showed a normal male karyotype. MDS FISH panel negative. Maintained on Hydrea therapy.\par \par History of renal cancer 10/2014-stage I(pT1a), status post right partial nephrectomy\par \par History of bladder cancer (noninvasive) 1994- status post TURBT. [de-identified] : Tripped over an object and fell recently-no fractures. \par Sees nephrologist Dr. Calderon, for kidney disease, hyperkalemia. \par Sees cardiologist for BP management; now undergoing carotid surveillance-being checked again in 6 months.\par Follows regularly with urologist (Dr. Valadez) for bladder tumors ([facility])-ongoing issues-has cystoscopy Q 4 months now.\par No fevers.\par No complaints of headache, pruritus, chest pain, shortness of breath, abdominal/pelvic pain. No cough. No bleeding.\par Daughter moved to [place].\par \par Has had COVID vaccines (Pfizer) x 3.

## 2022-11-01 NOTE — PHYSICAL EXAM
[Fully active, able to carry on all pre-disease performance without restriction] : Status 0 - Fully active, able to carry on all pre-disease performance without restriction [Normal] : affect appropriate [de-identified] : non-toxic appearing [de-identified] : soft, NT; spleen tip palpable LUQ; +ventral hernia, NT

## 2022-11-01 NOTE — RESULTS/DATA
[FreeTextEntry1] : 9/27/2022–creatinine 1.2, potassium 4.1, calcium 9.8, AST 26, ALT 28, alkaline phosphatase 61, total bilirubin 0.9, hemoglobin 14.7, hematocrit 44.9, , WBC 13.5, platelet count 423,000.

## 2022-11-09 ENCOUNTER — RX RENEWAL (OUTPATIENT)
Age: 72
End: 2022-11-09

## 2022-11-28 ENCOUNTER — LABORATORY RESULT (OUTPATIENT)
Age: 72
End: 2022-11-28

## 2022-11-29 LAB
ALBUMIN SERPL ELPH-MCNC: 5.4 G/DL
ALP BLD-CCNC: 74 U/L
ALT SERPL-CCNC: 33 U/L
AST SERPL-CCNC: 25 U/L
BASOPHILS # BLD AUTO: 0.42 K/UL
BASOPHILS NFR BLD AUTO: 2.6 %
BILIRUB DIRECT SERPL-MCNC: 0.2 MG/DL
BILIRUB INDIRECT SERPL-MCNC: 0.4 MG/DL
BILIRUB SERPL-MCNC: 0.6 MG/DL
CREAT SERPL-MCNC: 1.31 MG/DL
EGFR: 57 ML/MIN/1.73M2
EOSINOPHIL # BLD AUTO: 0.56 K/UL
EOSINOPHIL NFR BLD AUTO: 3.5 %
HCT VFR BLD CALC: 47.6 %
HGB BLD-MCNC: 15.3 G/DL
LYMPHOCYTES # BLD AUTO: 0.85 K/UL
LYMPHOCYTES NFR BLD AUTO: 5.3 %
MAN DIFF?: NORMAL
MCHC RBC-ENTMCNC: 32.1 GM/DL
MCHC RBC-ENTMCNC: 33.6 PG
MCV RBC AUTO: 104.6 FL
MONOCYTES # BLD AUTO: 0.71 K/UL
MONOCYTES NFR BLD AUTO: 4.4 %
NEUTROPHILS # BLD AUTO: 12.25 K/UL
NEUTROPHILS NFR BLD AUTO: 75.4 %
PLATELET # BLD AUTO: 498 K/UL
PROT SERPL-MCNC: 7.4 G/DL
RBC # BLD: 4.55 M/UL
RBC # FLD: 16.2 %
WBC # FLD AUTO: 16.05 K/UL

## 2023-01-05 ENCOUNTER — LABORATORY RESULT (OUTPATIENT)
Age: 73
End: 2023-01-05

## 2023-01-05 LAB
BASOPHILS # BLD AUTO: 0.54 K/UL
BASOPHILS NFR BLD AUTO: 2.6 %
EOSINOPHIL # BLD AUTO: 0.36 K/UL
EOSINOPHIL NFR BLD AUTO: 1.7 %
HCT VFR BLD CALC: 43.2 %
HGB BLD-MCNC: 13.8 G/DL
LYMPHOCYTES # BLD AUTO: 2.3 K/UL
LYMPHOCYTES NFR BLD AUTO: 11 %
MAN DIFF?: NORMAL
MCHC RBC-ENTMCNC: 31.9 GM/DL
MCHC RBC-ENTMCNC: 33.6 PG
MCV RBC AUTO: 105.1 FL
MONOCYTES # BLD AUTO: 0.52 K/UL
MONOCYTES NFR BLD AUTO: 2.5 %
NEUTROPHILS # BLD AUTO: 16.85 K/UL
NEUTROPHILS NFR BLD AUTO: 76.3 %
PLATELET # BLD AUTO: 543 K/UL
RBC # BLD: 4.11 M/UL
RBC # FLD: 15.8 %
WBC # FLD AUTO: 20.93 K/UL

## 2023-01-07 ENCOUNTER — RX RENEWAL (OUTPATIENT)
Age: 73
End: 2023-01-07

## 2023-01-09 ENCOUNTER — APPOINTMENT (OUTPATIENT)
Dept: NEPHROLOGY | Facility: CLINIC | Age: 73
End: 2023-01-09
Payer: MEDICARE

## 2023-01-09 VITALS
SYSTOLIC BLOOD PRESSURE: 162 MMHG | OXYGEN SATURATION: 96 % | WEIGHT: 201.72 LBS | HEART RATE: 79 BPM | HEIGHT: 65 IN | DIASTOLIC BLOOD PRESSURE: 77 MMHG | BODY MASS INDEX: 33.61 KG/M2 | TEMPERATURE: 97.2 F

## 2023-01-09 PROCEDURE — 99215 OFFICE O/P EST HI 40 MIN: CPT

## 2023-01-09 RX ORDER — METOPROLOL SUCCINATE 25 MG/1
25 TABLET, EXTENDED RELEASE ORAL
Refills: 0 | Status: DISCONTINUED | COMMUNITY
Start: 2023-01-09 | End: 2023-01-09

## 2023-01-09 RX ORDER — OXYCODONE 5 MG/1
5 TABLET ORAL
Qty: 40 | Refills: 0 | Status: DISCONTINUED | COMMUNITY
Start: 2022-10-26 | End: 2023-01-09

## 2023-01-09 RX ORDER — ASCORBIC ACID 500 MG
500 TABLET ORAL
Refills: 0 | Status: DISCONTINUED | COMMUNITY
End: 2023-01-09

## 2023-01-09 RX ORDER — PREDNISONE 50 MG/1
50 TABLET ORAL
Refills: 0 | Status: DISCONTINUED | COMMUNITY
Start: 2020-09-11 | End: 2023-01-09

## 2023-01-09 NOTE — HISTORY OF PRESENT ILLNESS
[FreeTextEntry1] : Today I had the pleasure of seeing Drew Morales he was born and raised in [place].  He used to live near [facility] and is a big car and baseball fan.  He now is retired and lives out on [address].  He has had a prior history of partial nephrectomy and hypertension for which he followed with Dr. Ortega.  On presentation today he feels great.  He has no complaints.  He is not SOB he has no NVD.  Urinating is ok.  Sometimes he has a little incontinence but he does not have a sensation of incomplete voiding.  He has recently had some cervical spinal issues and was taking mobic but his creatinine and bp have been stable.\par \par 10/18/21 -- Drew is here to see me today after about 2 years due to the pandemic.  He was recently told to come back and see me because of his creatinine, blood pressure, and potassium.  He reports that he continues to follow up with his hematologist for thrombocytosis, continues to follow up with internal medicine.  He recently established care with a cardiologist for erractic blood presure.  He reports that at home his blood pressure is often 160 to 190.  He was started on amlodipine last month and it seems to work but BP still elevated.  He reports a poor diet which consists of eating out daily frequently at fast food restaurants.  No palpitations no SOB.\par \par 1/6/21 -- Saw Drew today and recent developments have included addition of metoprolol by Patricia Dill for management of CAD, bladder polyps recurrence and is following with [facility].  He describes no issues with urination no chest pain no shortness of breath but is overall very anxious about his heart and about the findings on his bladder.\par \par 6/27/22 -- Saw Drew today had cardiac cath since last visit was done in march and repeat blood work since then was ok.  Follows up with [facility] for prior malignancy, follows up with Laisha and babatunde as well as [facility] for myeloprol disorder.  BP was elevated recently and internist added olmesartan with subsequent improvement. SBP at home mostly 120.\par \par 1/9/23 -- Since our last visit Drew reports feeling well.  No chest pain no shortness of breath.  Reports that his blood pressure has been trending up recently.  Has followed up with hematology and medicine.  Recent serum creatinine was 1.3.  He notes some edema and he also reports that he infrequently takes his torsemide because the frequent urination bothers him.

## 2023-01-09 NOTE — ASSESSMENT
[FreeTextEntry1] : 72 years old man with mild CKD here for follow up evaluation.\par \par Chronic Kidney Disease Stage IIIa -- Secondary to hypertension and partial nephrectomy.  I have reviewed the notes from Jacqueline Prince including the most recent creatinine which was 1.3 in november and provided my independent assessment that this is stable CKD.  We spent most of the visit discussing chronic kidney disease, its stages, risk for progression and strategies for prevention including avoidance of NSAIDs and notifying me of medication changes.  Since our last visit his blood pressure has been trending up.  Today we spoke about the role of olmesartan on creatinine.  I also counseled the patient on the importance of a "medication holiday" during periods of stress / dehydration\par \par Hypertension -- The patient's blood pressure is not at goal.  I recommended that he take torsemide each day and increase olmesartan to 40 mg and repeat blood work for me within two weeks.  Goals systolic BP is 120 to 130.\par \par f/u in 6 to 9 months.

## 2023-01-09 NOTE — REVIEW OF SYSTEMS
[Fever] : no fever [Chills] : no chills [Feeling Poorly] : not feeling poorly [Feeling Tired] : not feeling tired [Eyesight Problems] : no eyesight problems [Nosebleeds] : no nosebleeds [Nasal Discharge] : no nasal discharge [Chest Pain] : no chest pain [Palpitations] : no palpitations [Leg Claudication] : no intermittent leg claudication [Lower Ext Edema] : lower extremity edema [Shortness Of Breath] : no shortness of breath [Abdominal Pain] : no abdominal pain [Vomiting] : no vomiting [As Noted in HPI] : as noted in HPI [Arthralgias] : no arthralgias [Joint Swelling] : no joint swelling [Joint Stiffness] : no joint stiffness [Dizziness] : no dizziness [Fainting] : no fainting [Anxiety] : no anxiety [Depression] : no depression [Easy Bleeding] : a tendency for easy bleeding [Easy Bruising] : no tendency for easy bruising

## 2023-01-09 NOTE — PHYSICAL EXAM
[General Appearance - Alert] : alert [General Appearance - In No Acute Distress] : in no acute distress [General Appearance - Well Nourished] : well nourished [Sclera] : the sclera and conjunctiva were normal [Hearing Threshold Finger Rub Not [place]] : hearing was normal [Neck Appearance] : the appearance of the neck was normal [Neck Cervical Mass (___cm)] : no neck mass was observed [Jugular Venous Distention Increased] : there was no jugular-venous distention [Respiration, Rhythm And Depth] : normal respiratory rhythm and effort [Exaggerated Use Of Accessory Muscles For Inspiration] : no accessory muscle use [Auscultation Breath Sounds / Voice Sounds] : lungs were clear to auscultation bilaterally [Heart Sounds] : normal S1 and S2 [Heart Sounds Gallop] : no gallops [Murmurs] : no murmurs [Heart Sounds Pericardial Friction Rub] : no pericardial rub [___ +] : bilateral [unfilled]+ pretibial pitting edema [] : no rash [Oriented To Time, Place, And Person] : oriented to person, place, and time [Impaired Insight] : insight and judgment were intact [Affect] : the affect was normal [Mood] : the mood was normal

## 2023-01-12 ENCOUNTER — APPOINTMENT (OUTPATIENT)
Dept: FAMILY MEDICINE | Facility: CLINIC | Age: 73
End: 2023-01-12
Payer: MEDICARE

## 2023-01-12 VITALS
TEMPERATURE: 97.6 F | SYSTOLIC BLOOD PRESSURE: 142 MMHG | HEART RATE: 81 BPM | RESPIRATION RATE: 16 BRPM | BODY MASS INDEX: 33.15 KG/M2 | DIASTOLIC BLOOD PRESSURE: 68 MMHG | WEIGHT: 199 LBS | HEIGHT: 65 IN | OXYGEN SATURATION: 95 %

## 2023-01-12 PROCEDURE — 99214 OFFICE O/P EST MOD 30 MIN: CPT

## 2023-01-12 RX ORDER — MULTIVITAMIN
TABLET ORAL
Refills: 0 | Status: ACTIVE | COMMUNITY

## 2023-01-12 RX ORDER — MULTIVIT-MIN/FOLIC/VIT K/LYCOP 400-300MCG
50 MCG TABLET ORAL
Refills: 0 | Status: DISCONTINUED | COMMUNITY
End: 2023-01-12

## 2023-01-12 RX ORDER — DIPHENHYDRAMINE HYDROCHLORIDE 25 MG/1
25 CAPSULE ORAL
Qty: 2 | Refills: 0 | Status: DISCONTINUED | COMMUNITY
Start: 2020-09-11 | End: 2023-01-12

## 2023-01-12 RX ORDER — DIPHENHYDRAMINE HCL 25 MG/1
25 TABLET ORAL
Qty: 2 | Refills: 0 | Status: DISCONTINUED | COMMUNITY
Start: 2021-09-28 | End: 2023-01-12

## 2023-01-12 NOTE — HISTORY OF PRESENT ILLNESS
[FreeTextEntry8] : Here to address Hypertension, CKD, anxiety, leg edema. He met his cardiologist recently and was recommended to increase Benicar to 400 mg from 20 mg daily and to take Telmisartan daily. He is concerned about possible effect of his potassium level. He has increased Benicar to 30 mg daily for last couple days.He had worsening leg swelling during holidays and leg swelling has gone down now. He has MPD and follows dr. Phillips. He is AAOX3,NAD.

## 2023-01-12 NOTE — REVIEW OF SYSTEMS
[Lower Ext Edema] : lower extremity edema [Negative] : Heme/Lymph [FreeTextEntry5] : 1+ b/l pedal edema

## 2023-01-12 NOTE — PLAN
[FreeTextEntry1] : discussed medication and their possible interaction/side effects.\par  agree with increase in Benicar and monitoring of renal function and potassium.\par  pt. will increase Torsemide to daily.\par  anxiety : stable. medicine refilled.

## 2023-01-12 NOTE — PHYSICAL EXAM
[No Acute Distress] : no acute distress [Well Nourished] : well nourished [Well Developed] : well developed [Well-Appearing] : well-appearing [Normal Sclera/Conjunctiva] : normal sclera/conjunctiva [PERRL] : pupils equal round and reactive to light [EOMI] : extraocular movements intact [Normal Outer Ear/Nose] : the outer ears and nose were normal in appearance [Normal Oropharynx] : the oropharynx was normal [No JVD] : no jugular venous distention [No Lymphadenopathy] : no lymphadenopathy [Supple] : supple [Thyroid Normal, No Nodules] : the thyroid was normal and there were no nodules present [No Respiratory Distress] : no respiratory distress  [No Accessory Muscle Use] : no accessory muscle use [Clear to Auscultation] : lungs were clear to auscultation bilaterally [Normal Rate] : normal rate  [Regular Rhythm] : with a regular rhythm [Normal S1, S2] : normal S1 and S2 [No Murmur] : no murmur heard [No Carotid Bruits] : no carotid bruits [No Abdominal Bruit] : a ~M bruit was not heard ~T in the abdomen [No Varicosities] : no varicosities [Pedal Pulses Present] : the pedal pulses are present [No Extremity Clubbing/Cyanosis] : no extremity clubbing/cyanosis [Soft] : abdomen soft [Non Tender] : non-tender [Non-distended] : non-distended [No Masses] : no abdominal mass palpated [No HSM] : no HSM [Normal Bowel Sounds] : normal bowel sounds [Normal Posterior Cervical Nodes] : no posterior cervical lymphadenopathy [Normal Anterior Cervical Nodes] : no anterior cervical lymphadenopathy [No CVA Tenderness] : no CVA  tenderness [No Spinal Tenderness] : no spinal tenderness [No Joint Swelling] : no joint swelling [Grossly Normal Strength/Tone] : grossly normal strength/tone [No Rash] : no rash [Coordination Grossly Intact] : coordination grossly intact [No Focal Deficits] : no focal deficits [Normal Gait] : normal gait [Deep Tendon Reflexes (DTR)] : deep tendon reflexes were 2+ and symmetric [Normal Affect] : the affect was normal [Normal Insight/Judgement] : insight and judgment were intact [de-identified] : 1 + pedal edema b/l

## 2023-01-12 NOTE — HEALTH RISK ASSESSMENT
[Former] : Former [No] : In the past 12 months have you used drugs other than those required for medical reasons? No [No falls in past year] : Patient reported no falls in the past year [0] : 2) Feeling down, depressed, or hopeless: Not at all (0) [PHQ-2 Negative - No further assessment needed] : PHQ-2 Negative - No further assessment needed [de-identified] : regular [[id number]] : 0

## 2023-01-27 ENCOUNTER — APPOINTMENT (OUTPATIENT)
Dept: INTERNAL MEDICINE | Facility: CLINIC | Age: 73
End: 2023-01-27
Payer: MEDICARE

## 2023-01-27 ENCOUNTER — APPOINTMENT (OUTPATIENT)
Dept: INTERNAL MEDICINE | Facility: CLINIC | Age: 73
End: 2023-01-27

## 2023-01-27 VITALS
SYSTOLIC BLOOD PRESSURE: 140 MMHG | HEART RATE: 80 BPM | WEIGHT: 199 LBS | RESPIRATION RATE: 16 BRPM | BODY MASS INDEX: 33.15 KG/M2 | HEIGHT: 65 IN | OXYGEN SATURATION: 99 % | DIASTOLIC BLOOD PRESSURE: 70 MMHG | TEMPERATURE: 97.6 F

## 2023-01-27 DIAGNOSIS — I65.29 OCCLUSION AND STENOSIS OF UNSPECIFIED CAROTID ARTERY: ICD-10-CM

## 2023-01-27 PROCEDURE — 99205 OFFICE O/P NEW HI 60 MIN: CPT

## 2023-01-27 PROCEDURE — 99215 OFFICE O/P EST HI 40 MIN: CPT

## 2023-01-27 NOTE — HISTORY OF PRESENT ILLNESS
[FreeTextEntry8] : New patient visit\par HTN\par ANxiety\par CKD\par on meds list reviewed with patient\par \par Concerned about too many doctors telling him different things\par \par benicar was increased but he's only taking 30 mg (one and a half tabs 20mg ) daily\par \par Had cardiac stents last year x 2 and has carotid arteriosclerosis\par \par \par hx partial nephrectomy for Renal CA; essential thrombocytosis on hydrea sees Dr Phillips\par \par \par \par didn't take his torsemide today 2/2 urine\par \par \par \par \par

## 2023-01-30 LAB
ALBUMIN SERPL ELPH-MCNC: 5.3 G/DL
ANION GAP SERPL CALC-SCNC: 17 MMOL/L
BUN SERPL-MCNC: 42 MG/DL
CALCIUM SERPL-MCNC: 10.2 MG/DL
CHLORIDE SERPL-SCNC: 98 MMOL/L
CO2 SERPL-SCNC: 22 MMOL/L
CREAT SERPL-MCNC: 1.51 MG/DL
CREAT SPEC-SCNC: 87 MG/DL
CREAT/PROT UR: 0.3 RATIO
EGFR: 48 ML/MIN/1.73M2
GLUCOSE SERPL-MCNC: 135 MG/DL
PHOSPHATE SERPL-MCNC: 4.1 MG/DL
POTASSIUM SERPL-SCNC: 5.6 MMOL/L
PROT UR-MCNC: 22 MG/DL
SODIUM SERPL-SCNC: 137 MMOL/L

## 2023-02-03 ENCOUNTER — RX RENEWAL (OUTPATIENT)
Age: 73
End: 2023-02-03

## 2023-02-06 ENCOUNTER — RX RENEWAL (OUTPATIENT)
Age: 73
End: 2023-02-06

## 2023-02-07 ENCOUNTER — RX RENEWAL (OUTPATIENT)
Age: 73
End: 2023-02-07

## 2023-02-21 LAB
ALBUMIN SERPL ELPH-MCNC: 5.1 G/DL
ANION GAP SERPL CALC-SCNC: 18 MMOL/L
BUN SERPL-MCNC: 36 MG/DL
CALCIUM SERPL-MCNC: 10.5 MG/DL
CHLORIDE SERPL-SCNC: 102 MMOL/L
CO2 SERPL-SCNC: 20 MMOL/L
CREAT SERPL-MCNC: 1.56 MG/DL
EGFR: 46 ML/MIN/1.73M2
GLUCOSE SERPL-MCNC: 146 MG/DL
PHOSPHATE SERPL-MCNC: 4.5 MG/DL
POTASSIUM SERPL-SCNC: 5.6 MMOL/L
SODIUM SERPL-SCNC: 140 MMOL/L

## 2023-02-22 ENCOUNTER — OUTPATIENT (OUTPATIENT)
Dept: OUTPATIENT SERVICES | Facility: HOSPITAL | Age: 73
LOS: 1 days | Discharge: HOME | End: 2023-02-22

## 2023-02-22 DIAGNOSIS — Z98.890 OTHER SPECIFIED POSTPROCEDURAL STATES: Chronic | ICD-10-CM

## 2023-02-22 DIAGNOSIS — Z85.51 PERSONAL HISTORY OF MALIGNANT NEOPLASM OF BLADDER: Chronic | ICD-10-CM

## 2023-02-22 DIAGNOSIS — D47.1 CHRONIC MYELOPROLIFERATIVE DISEASE: ICD-10-CM

## 2023-02-23 ENCOUNTER — NON-APPOINTMENT (OUTPATIENT)
Age: 73
End: 2023-02-23

## 2023-02-23 ENCOUNTER — APPOINTMENT (OUTPATIENT)
Dept: INTERNAL MEDICINE | Facility: CLINIC | Age: 73
End: 2023-02-23
Payer: MEDICARE

## 2023-02-23 VITALS
TEMPERATURE: 97.7 F | BODY MASS INDEX: 32.49 KG/M2 | SYSTOLIC BLOOD PRESSURE: 120 MMHG | DIASTOLIC BLOOD PRESSURE: 64 MMHG | RESPIRATION RATE: 16 BRPM | HEIGHT: 65 IN | WEIGHT: 195 LBS | HEART RATE: 77 BPM | OXYGEN SATURATION: 98 %

## 2023-02-23 LAB
ALBUMIN SERPL ELPH-MCNC: NORMAL
ANION GAP SERPL CALC-SCNC: NORMAL MMOL/L
BUN SERPL-MCNC: NORMAL
CALCIUM SERPL-MCNC: NORMAL
CHLORIDE SERPL-SCNC: NORMAL
CO2 SERPL-SCNC: NORMAL
CREAT SERPL-MCNC: NORMAL
EGFR: NORMAL ML/MIN/1.73M2
GLUCOSE SERPL-MCNC: NORMAL
PHOSPHATE SERPL-MCNC: NORMAL
POTASSIUM SERPL-SCNC: NORMAL
SODIUM SERPL-SCNC: NORMAL

## 2023-02-23 PROCEDURE — 99214 OFFICE O/P EST MOD 30 MIN: CPT

## 2023-02-23 RX ORDER — ATORVASTATIN CALCIUM 10 MG/1
10 TABLET, FILM COATED ORAL
Qty: 30 | Refills: 0 | Status: DISCONTINUED | COMMUNITY
Start: 2022-02-03 | End: 2023-02-23

## 2023-02-23 NOTE — HISTORY OF PRESENT ILLNESS
[FreeTextEntry1] : CKD HTN F/U [de-identified] : pt had labs w/ renal concerned about ilncrease in Cr and they maybe ran labs on old sample? Pt did not \par go for labs\par labs stated "icteric" but pt had no bilirubin \par Pt was olnly taking 30mg benicar 20 plus half of a 20mg\par \par rx'd bicarbonate but pt did not take\par \par \par

## 2023-02-23 NOTE — PHYSICAL EXAM
[No Acute Distress] : no acute distress [Well Nourished] : well nourished [Well Developed] : well developed [Well-Appearing] : well-appearing [Normal Sclera/Conjunctiva] : normal sclera/conjunctiva [PERRL] : pupils equal round and reactive to light [EOMI] : extraocular movements intact [Normal Outer Ear/Nose] : the outer ears and nose were normal in appearance [Normal Oropharynx] : the oropharynx was normal [No JVD] : no jugular venous distention [No Lymphadenopathy] : no lymphadenopathy [Supple] : supple [Thyroid Normal, No Nodules] : the thyroid was normal and there were no nodules present [No Respiratory Distress] : no respiratory distress  [No Accessory Muscle Use] : no accessory muscle use [Clear to Auscultation] : lungs were clear to auscultation bilaterally [Normal Rate] : normal rate  [Regular Rhythm] : with a regular rhythm [Normal S1, S2] : normal S1 and S2 [No Murmur] : no murmur heard [No Carotid Bruits] : no carotid bruits [No Abdominal Bruit] : a ~M bruit was not heard ~T in the abdomen [No Varicosities] : no varicosities [Pedal Pulses Present] : the pedal pulses are present [No Edema] : there was no peripheral edema [No Palpable Aorta] : no palpable aorta [No Extremity Clubbing/Cyanosis] : no extremity clubbing/cyanosis [Soft] : abdomen soft [Non Tender] : non-tender [Non-distended] : non-distended [No Masses] : no abdominal mass palpated [No HSM] : no HSM [Normal Bowel Sounds] : normal bowel sounds [Normal Posterior Cervical Nodes] : no posterior cervical lymphadenopathy [Normal Anterior Cervical Nodes] : no anterior cervical lymphadenopathy [No CVA Tenderness] : no CVA  tenderness [No Spinal Tenderness] : no spinal tenderness [No Joint Swelling] : no joint swelling [Grossly Normal Strength/Tone] : grossly normal strength/tone [No Rash] : no rash [Coordination Grossly Intact] : coordination grossly intact [No Focal Deficits] : no focal deficits [Normal Gait] : normal gait [Deep Tendon Reflexes (DTR)] : deep tendon reflexes were 2+ and symmetric [Normal Affect] : the affect was normal [Normal Insight/Judgement] : insight and judgment were intact [de-identified] : NO ICTERIC

## 2023-03-02 ENCOUNTER — APPOINTMENT (OUTPATIENT)
Dept: HEMATOLOGY ONCOLOGY | Facility: CLINIC | Age: 73
End: 2023-03-02
Payer: MEDICARE

## 2023-03-09 ENCOUNTER — APPOINTMENT (OUTPATIENT)
Dept: INTERNAL MEDICINE | Facility: CLINIC | Age: 73
End: 2023-03-09
Payer: MEDICARE

## 2023-03-09 VITALS
OXYGEN SATURATION: 96 % | HEIGHT: 65 IN | TEMPERATURE: 97.9 F | HEART RATE: 88 BPM | BODY MASS INDEX: 32.49 KG/M2 | DIASTOLIC BLOOD PRESSURE: 68 MMHG | WEIGHT: 195 LBS | SYSTOLIC BLOOD PRESSURE: 114 MMHG | RESPIRATION RATE: 15 BRPM

## 2023-03-09 PROCEDURE — 99214 OFFICE O/P EST MOD 30 MIN: CPT

## 2023-03-09 NOTE — HISTORY OF PRESENT ILLNESS
[FreeTextEntry1] : HTN Follow up  [de-identified] : Had his labs done reviewed with patient\par still taking torsemide daily and only 20 mg olemesartan\par high sugar / A1C > 7 \par pt states "Im [place]"\par \par \par CKD reviewed w/ pt encouraged him to see renal for follow up \par \par Completing plavix

## 2023-03-19 LAB
25(OH)D3 SERPL-MCNC: 34.6 NG/ML
ALBUMIN SERPL ELPH-MCNC: 5.1 G/DL
ALP BLD-CCNC: 66 U/L
ALT SERPL-CCNC: 27 U/L
ANION GAP SERPL CALC-SCNC: 16 MMOL/L
APPEARANCE: CLEAR
AST SERPL-CCNC: 20 U/L
BACTERIA: NEGATIVE
BASOPHILS # BLD AUTO: 0.46 K/UL
BASOPHILS NFR BLD AUTO: 2.6 %
BILIRUB SERPL-MCNC: 0.6 MG/DL
BILIRUBIN URINE: NEGATIVE
BLOOD URINE: NEGATIVE
BUN SERPL-MCNC: 44 MG/DL
CALCIUM SERPL-MCNC: 10.5 MG/DL
CHLORIDE SERPL-SCNC: 101 MMOL/L
CHOLEST SERPL-MCNC: 130 MG/DL
CO2 SERPL-SCNC: 25 MMOL/L
COLOR: NORMAL
CREAT SERPL-MCNC: 1.71 MG/DL
CREAT SPEC-SCNC: 59 MG/DL
CREAT/PROT UR: NORMAL RATIO
EGFR: 41 ML/MIN/1.73M2
EOSINOPHIL # BLD AUTO: 0 K/UL
EOSINOPHIL NFR BLD AUTO: 0 %
ESTIMATED AVERAGE GLUCOSE: 171 MG/DL
GLUCOSE QUALITATIVE U: NEGATIVE
GLUCOSE SERPL-MCNC: 105 MG/DL
HBA1C MFR BLD HPLC: 7.6 %
HCT VFR BLD CALC: 42.4 %
HDLC SERPL-MCNC: 32 MG/DL
HGB BLD-MCNC: 13.7 G/DL
HYALINE CASTS: 0 /LPF
KETONES URINE: NEGATIVE
LDLC SERPL CALC-MCNC: 71 MG/DL
LEUKOCYTE ESTERASE URINE: NEGATIVE
LYMPHOCYTES # BLD AUTO: 2.33 K/UL
LYMPHOCYTES NFR BLD AUTO: 13.2 %
MAN DIFF?: NORMAL
MCHC RBC-ENTMCNC: 32.3 GM/DL
MCHC RBC-ENTMCNC: 33.7 PG
MCV RBC AUTO: 104.4 FL
MICROSCOPIC-UA: NORMAL
MONOCYTES # BLD AUTO: 0.16 K/UL
MONOCYTES NFR BLD AUTO: 0.9 %
NEUTROPHILS # BLD AUTO: 13.93 K/UL
NEUTROPHILS NFR BLD AUTO: 78.9 %
NITRITE URINE: NEGATIVE
NONHDLC SERPL-MCNC: 98 MG/DL
PH URINE: 5.5
PLATELET # BLD AUTO: 433 K/UL
POTASSIUM SERPL-SCNC: 5.7 MMOL/L
PROT SERPL-MCNC: 7.3 G/DL
PROT UR-MCNC: <4 MG/DL
PROTEIN URINE: NEGATIVE
PSA SERPL-MCNC: 1.54 NG/ML
RBC # BLD: 4.06 M/UL
RBC # FLD: 17.1 %
RED BLOOD CELLS URINE: 0 /HPF
SODIUM SERPL-SCNC: 143 MMOL/L
SPECIFIC GRAVITY URINE: 1.01
SQUAMOUS EPITHELIAL CELLS: 0 /HPF
TRIGL SERPL-MCNC: 135 MG/DL
TSH SERPL-ACNC: 3.77 UIU/ML
UROBILINOGEN URINE: NORMAL
WBC # FLD AUTO: 17.65 K/UL
WHITE BLOOD CELLS URINE: 0 /HPF

## 2023-03-20 ENCOUNTER — RX RENEWAL (OUTPATIENT)
Age: 73
End: 2023-03-20

## 2023-03-20 RX ORDER — METOPROLOL SUCCINATE 25 MG/1
25 TABLET, EXTENDED RELEASE ORAL
Qty: 10 | Refills: 0 | Status: ACTIVE | COMMUNITY
Start: 2021-12-22 | End: 1900-01-01

## 2023-03-30 ENCOUNTER — APPOINTMENT (OUTPATIENT)
Dept: HEMATOLOGY ONCOLOGY | Facility: CLINIC | Age: 73
End: 2023-03-30
Payer: MEDICARE

## 2023-03-30 VITALS
SYSTOLIC BLOOD PRESSURE: 154 MMHG | BODY MASS INDEX: 33.28 KG/M2 | WEIGHT: 200 LBS | DIASTOLIC BLOOD PRESSURE: 68 MMHG | TEMPERATURE: 98.2 F | HEART RATE: 89 BPM | OXYGEN SATURATION: 98 % | RESPIRATION RATE: 16 BRPM

## 2023-03-30 PROCEDURE — 99214 OFFICE O/P EST MOD 30 MIN: CPT

## 2023-03-30 NOTE — PHYSICAL EXAM
[Fully active, able to carry on all pre-disease performance without restriction] : Status 0 - Fully active, able to carry on all pre-disease performance without restriction [Normal] : affect appropriate [de-identified] : non-toxic appearing [de-identified] : soft, NT; spleen tip palpable LUQ; +ventral hernia, NT

## 2023-03-30 NOTE — ASSESSMENT
[FreeTextEntry1] : Lab work reviewed.\par #1) Myeloproliferative disorder with thrombocytosis/leukocytosis-clinically stable at present. Continue with current Hydrea dosing at this time. Patient also remains on aspirin 81 mg daily.\par Interval f/u lab work to be done.\par PO fluid hydration as tolerated.\par \par #2) Patient continues under urologic care at [facility] for his  malignancy. He is to continue follow-up with nephrologist for his kidney disease/h/o hyperkalemia, as well.\par \par Patient was given the opportunity to ask questions. His questions have been answered to his apparent satisfaction at this time. He is agreeable to recommended follow-up. \par

## 2023-03-30 NOTE — HISTORY OF PRESENT ILLNESS
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 Flow cytometry of the bone marrow aspirate lymphocytes showed no diagnostic abnormalities. Reticulin stain showed a slight increase and trichrome stain was negative for collagen (grade 1 fibrosis). Bone marrow aspirate iron stain showed iron stores present with ringed sideroblasts (less than 15% of cells). Quantitative BCR-ABL by RT-PCR negative. Cytogenetics showed a normal male karyotype. MDS FISH panel negative. Maintained on Hydrea therapy.\par \par History of renal cancer 10/2014-stage I(pT1a), status post right partial nephrectomy\par \par History of bladder cancer (noninvasive) 1994- status post TURBT. [de-identified] : Follows regularly with urologist (Dr. Valadez) for bladder tumors ([facility]ongoing issues-has cystoscopy Q 4 months now. Had 2 more bladder tumors removed 2 months ago. No bleeding.\par Sees nephrologist Dr. Calderon, for kidney disease, hyperkalemia. \par Sees cardiologist for BP management. Told to stop plavix.\par No fevers.\par No complaints of headache, pruritus, chest pain, shortness of breath, abdominal/pelvic pain. No cough.\par Daughter moved to [place], and patient thinking of moving there (just put his house on the market).\par \par

## 2023-04-11 ENCOUNTER — LABORATORY RESULT (OUTPATIENT)
Age: 73
End: 2023-04-11

## 2023-04-13 ENCOUNTER — APPOINTMENT (OUTPATIENT)
Dept: INTERNAL MEDICINE | Facility: CLINIC | Age: 73
End: 2023-04-13
Payer: MEDICARE

## 2023-04-13 VITALS
HEIGHT: 65 IN | RESPIRATION RATE: 18 BRPM | DIASTOLIC BLOOD PRESSURE: 64 MMHG | OXYGEN SATURATION: 96 % | WEIGHT: 200 LBS | SYSTOLIC BLOOD PRESSURE: 106 MMHG | TEMPERATURE: 98.1 F | HEART RATE: 87 BPM | BODY MASS INDEX: 33.32 KG/M2

## 2023-04-13 DIAGNOSIS — R73.03 PREDIABETES.: ICD-10-CM

## 2023-04-13 PROCEDURE — 99214 OFFICE O/P EST MOD 30 MIN: CPT

## 2023-04-13 NOTE — HISTORY OF PRESENT ILLNESS
[FreeTextEntry1] : Here for follow up  [de-identified] : Amadeo edema going in AM\par \par \par Labs reviewed

## 2023-04-14 LAB
ALBUMIN SERPL ELPH-MCNC: 5.3 G/DL
ALP BLD-CCNC: 64 U/L
ALT SERPL-CCNC: 31 U/L
ANION GAP SERPL CALC-SCNC: 16 MMOL/L
AST SERPL-CCNC: 29 U/L
BASOPHILS # BLD AUTO: 0.78 K/UL
BASOPHILS NFR BLD AUTO: 4.4 %
BILIRUB SERPL-MCNC: 0.6 MG/DL
BUN SERPL-MCNC: 37 MG/DL
CALCIUM SERPL-MCNC: 10.1 MG/DL
CHLORIDE SERPL-SCNC: 102 MMOL/L
CHOLEST SERPL-MCNC: 112 MG/DL
CO2 SERPL-SCNC: 24 MMOL/L
CREAT SERPL-MCNC: 1.48 MG/DL
CRP SERPL HS-MCNC: 0.66 MG/L
EGFR: 49 ML/MIN/1.73M2
EOSINOPHIL # BLD AUTO: 0 K/UL
EOSINOPHIL NFR BLD AUTO: 0 %
ESTIMATED AVERAGE GLUCOSE: 143 MG/DL
HBA1C MFR BLD HPLC: 6.6 %
HCT VFR BLD CALC: 40.7 %
HDLC SERPL-MCNC: 32 MG/DL
HGB BLD-MCNC: 13.2 G/DL
LDLC SERPL CALC-MCNC: 55 MG/DL
LYMPHOCYTES # BLD AUTO: 1.41 K/UL
LYMPHOCYTES NFR BLD AUTO: 7.9 %
MAN DIFF?: NORMAL
MCHC RBC-ENTMCNC: 32.4 GM/DL
MCHC RBC-ENTMCNC: 36.1 PG
MCV RBC AUTO: 111.2 FL
MONOCYTES # BLD AUTO: 0.16 K/UL
MONOCYTES NFR BLD AUTO: 0.9 %
NEUTROPHILS # BLD AUTO: 14.72 K/UL
NEUTROPHILS NFR BLD AUTO: 82.5 %
NONHDLC SERPL-MCNC: 80 MG/DL
PLATELET # BLD AUTO: 521 K/UL
POTASSIUM SERPL-SCNC: 5.2 MMOL/L
PROT SERPL-MCNC: 7.2 G/DL
RBC # BLD: 3.66 M/UL
RBC # FLD: 18.1 %
SODIUM SERPL-SCNC: 143 MMOL/L
TRIGL SERPL-MCNC: 126 MG/DL
WBC # FLD AUTO: 17.84 K/UL

## 2023-05-18 ENCOUNTER — LABORATORY RESULT (OUTPATIENT)
Age: 73
End: 2023-05-18

## 2023-06-19 ENCOUNTER — LABORATORY RESULT (OUTPATIENT)
Age: 73
End: 2023-06-19

## 2023-06-21 ENCOUNTER — LABORATORY RESULT (OUTPATIENT)
Age: 73
End: 2023-06-21

## 2023-06-21 ENCOUNTER — APPOINTMENT (OUTPATIENT)
Dept: INTERNAL MEDICINE | Facility: CLINIC | Age: 73
End: 2023-06-21
Payer: MEDICARE

## 2023-06-21 VITALS
OXYGEN SATURATION: 97 % | DIASTOLIC BLOOD PRESSURE: 64 MMHG | WEIGHT: 183 LBS | BODY MASS INDEX: 30.49 KG/M2 | HEIGHT: 65 IN | SYSTOLIC BLOOD PRESSURE: 120 MMHG | RESPIRATION RATE: 16 BRPM | TEMPERATURE: 98 F | HEART RATE: 89 BPM

## 2023-06-21 PROCEDURE — 99214 OFFICE O/P EST MOD 30 MIN: CPT

## 2023-06-21 NOTE — HISTORY OF PRESENT ILLNESS
[FreeTextEntry1] : left foot and ankle pain [de-identified] : suddne severe left ankle pain and foot pain couldn't move needed cane \par ankle was swollen no redness\par swelling feet top of  even putting socks on hurt\par \par called cardiologist saw patient for blood clot testing US NEGATIVE\par no recent fall or trauma\par \par swelling better on same dose of torsemide he usually takes\par

## 2023-07-10 ENCOUNTER — APPOINTMENT (OUTPATIENT)
Dept: INTERNAL MEDICINE | Facility: CLINIC | Age: 73
End: 2023-07-10
Payer: MEDICARE

## 2023-07-10 VITALS
OXYGEN SATURATION: 99 % | RESPIRATION RATE: 16 BRPM | TEMPERATURE: 97.9 F | SYSTOLIC BLOOD PRESSURE: 118 MMHG | HEART RATE: 86 BPM | DIASTOLIC BLOOD PRESSURE: 70 MMHG | HEIGHT: 65 IN | BODY MASS INDEX: 30.49 KG/M2 | WEIGHT: 183 LBS

## 2023-07-10 DIAGNOSIS — I12.9 HYPERTENSIVE CHRONIC KIDNEY DISEASE WITH STAGE 1 THROUGH STAGE 4 CHRONIC KIDNEY DISEASE, OR UNSPECIFIED CHRONIC KIDNEY DISEASE: ICD-10-CM

## 2023-07-10 PROCEDURE — 99214 OFFICE O/P EST MOD 30 MIN: CPT

## 2023-07-11 LAB
25(OH)D3 SERPL-MCNC: 32.3 NG/ML
A PHAGOCYTOPH IGG TITR SER IF: NORMAL TITER
ALBUMIN SERPL ELPH-MCNC: 4.8 G/DL
ALP BLD-CCNC: 73 U/L
ALT SERPL-CCNC: 30 U/L
ANION GAP SERPL CALC-SCNC: 16 MMOL/L
APPEARANCE: CLEAR
AST SERPL-CCNC: 25 U/L
B BURGDOR AB SER QL IA: NEGATIVE
B MICROTI IGG TITR SER: NORMAL TITER
BILIRUB SERPL-MCNC: 0.6 MG/DL
BILIRUBIN URINE: NEGATIVE
BLOOD URINE: NEGATIVE
BUN SERPL-MCNC: 25 MG/DL
CALCIUM SERPL-MCNC: 9.9 MG/DL
CCP AB SER IA-ACNC: <8 UNITS
CHLORIDE SERPL-SCNC: 100 MMOL/L
CHOLEST SERPL-MCNC: 125 MG/DL
CO2 SERPL-SCNC: 27 MMOL/L
COLOR: YELLOW
CREAT SERPL-MCNC: 1.35 MG/DL
CRP SERPL-MCNC: 14 MG/L
E CHAFFEENSIS IGG TITR SER IF: NORMAL TITER
EGFR: 55 ML/MIN/1.73M2
ERYTHROCYTE [SEDIMENTATION RATE] IN BLOOD BY WESTERGREN METHOD: 33 MM/HR
ESTIMATED AVERAGE GLUCOSE: 131 MG/DL
GLUCOSE QUALITATIVE U: NEGATIVE MG/DL
GLUCOSE SERPL-MCNC: 127 MG/DL
HBA1C MFR BLD HPLC: 6.2 %
HDLC SERPL-MCNC: 27 MG/DL
KETONES URINE: NEGATIVE MG/DL
LDLC SERPL CALC-MCNC: 67 MG/DL
LEUKOCYTE ESTERASE URINE: NEGATIVE
NITRITE URINE: NEGATIVE
NONHDLC SERPL-MCNC: 97 MG/DL
PH URINE: 5.5
POTASSIUM SERPL-SCNC: 5.3 MMOL/L
PROT SERPL-MCNC: 7.2 G/DL
PROTEIN URINE: NEGATIVE MG/DL
PSA FREE FLD-MCNC: 14 %
PSA FREE SERPL-MCNC: 0.28 NG/ML
PSA SERPL-MCNC: 1.93 NG/ML
RF+CCP IGG SER-IMP: NEGATIVE
RHEUMATOID FACT SER QL: <10 IU/ML
SODIUM SERPL-SCNC: 142 MMOL/L
SPECIFIC GRAVITY URINE: 1.01
T4 FREE SERPL-MCNC: 1.3 NG/DL
T4 SERPL-MCNC: 8.7 UG/DL
TRIGL SERPL-MCNC: 154 MG/DL
TSH SERPL-ACNC: 1.94 UIU/ML
TSH SERPL-ACNC: 1.98 UIU/ML
URATE SERPL-MCNC: 10.5 MG/DL
UROBILINOGEN URINE: 0.2 MG/DL

## 2023-07-14 ENCOUNTER — RX RENEWAL (OUTPATIENT)
Age: 73
End: 2023-07-14

## 2023-07-14 RX ORDER — AMLODIPINE BESYLATE 10 MG/1
10 TABLET ORAL
Qty: 90 | Refills: 3 | Status: ACTIVE | COMMUNITY
Start: 2021-02-18 | End: 1900-01-01

## 2023-07-19 ENCOUNTER — OUTPATIENT (OUTPATIENT)
Dept: OUTPATIENT SERVICES | Facility: HOSPITAL | Age: 73
LOS: 1 days | Discharge: HOME | End: 2023-07-19

## 2023-07-19 DIAGNOSIS — Z98.890 OTHER SPECIFIED POSTPROCEDURAL STATES: Chronic | ICD-10-CM

## 2023-07-19 DIAGNOSIS — D47.1 CHRONIC MYELOPROLIFERATIVE DISEASE: ICD-10-CM

## 2023-07-19 DIAGNOSIS — Z85.51 PERSONAL HISTORY OF MALIGNANT NEOPLASM OF BLADDER: Chronic | ICD-10-CM

## 2023-07-26 PROBLEM — D75.839 THROMBOCYTOSIS: Status: ACTIVE | Noted: 2020-10-07

## 2023-07-27 ENCOUNTER — APPOINTMENT (OUTPATIENT)
Dept: HEMATOLOGY ONCOLOGY | Facility: CLINIC | Age: 73
End: 2023-07-27
Payer: MEDICARE

## 2023-07-27 VITALS
TEMPERATURE: 97.7 F | RESPIRATION RATE: 16 BRPM | HEIGHT: 65 IN | BODY MASS INDEX: 31.29 KG/M2 | HEART RATE: 78 BPM | DIASTOLIC BLOOD PRESSURE: 69 MMHG | SYSTOLIC BLOOD PRESSURE: 111 MMHG | OXYGEN SATURATION: 98 % | WEIGHT: 187.83 LBS

## 2023-07-27 DIAGNOSIS — D75.839 THROMBOCYTOSIS, UNSPECIFIED: ICD-10-CM

## 2023-07-27 PROCEDURE — 99214 OFFICE O/P EST MOD 30 MIN: CPT

## 2023-07-27 NOTE — PHYSICAL EXAM
[Fully active, able to carry on all pre-disease performance without restriction] : Status 0 - Fully active, able to carry on all pre-disease performance without restriction [Normal] : affect appropriate [de-identified] : non-toxic appearing [de-identified] : soft, NT; spleen tip palpable LUQ; +ventral hernia, NT [de-identified] : right first finger and wrist soft tissue swelling-no erythema or tenderness

## 2023-07-27 NOTE — ASSESSMENT
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 Bone marrow biopsy was consistent with a GWEN 2 positive myelodysplastic/myeloproliferative neoplasm. Flow cytometry of the bone marrow aspirate lymphocytes showed no diagnostic abnormalities. Reticulin stain showed a slight increase and trichrome stain was negative for collagen (grade 1 fibrosis). Bone marrow aspirate iron stain showed iron stores present with ringed sideroblasts (less than 15% of cells). Quantitative BCR-ABL by RT-PCR negative. Cytogenetics showed a normal male karyotype. MDS FISH panel negative. Maintained on Hydrea therapy.\par Clinically stable at present. Though, elevated uric acid may be seen with MPD's-to see rheumatology as planned. Continue with current Hydrea dosing at this time. Patient also remains on aspirin 81 mg daily.\par Interval f/u lab work to be done.\par PO fluid hydration as tolerated.\par \par #2) Patient continues under urologic care at [facility] for his  malignancy. \par He is to continue follow-up with nephrologist for his kidney disease/h/o hyperkalemia, as well.\par \par Patient was given the opportunity to ask questions. His questions have been answered to his apparent satisfaction at this time. He is agreeable to recommended follow-up. \par

## 2023-07-27 NOTE — HISTORY OF PRESENT ILLNESS
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 Flow cytometry of the bone marrow aspirate lymphocytes showed no diagnostic abnormalities. Reticulin stain showed a slight increase and trichrome stain was negative for collagen (grade 1 fibrosis). Bone marrow aspirate iron stain showed iron stores present with ringed sideroblasts (less than 15% of cells). Quantitative BCR-ABL by RT-PCR negative. Cytogenetics showed a normal male karyotype. MDS FISH panel negative. Maintained on Hydrea therapy.\par \par History of renal cancer 10/2014-stage I(pT1a), status post right partial nephrectomy\par \par History of bladder cancer (noninvasive) 1994- status post TURBT. [de-identified] : 2 Months ago diagnosed with gout (elevated uric acid level)-treated with steroids by PCP, which did help with most of symptoms. But has appt. with rheumatology today, as still with some joint swelling.\par Follows regularly with urologist (Dr. Valadez) for bladder tumors ([facility])-has cystoscopy Q 4 months. No current bleeding.\par Sees nephrologist Dr. Calderon, for kidney disease, hyperkalemia. \par Sees cardiologist for BP management. \par No fevers.\par No complaints of headache, pruritus, chest pain, shortness of breath, abdominal/pelvic pain. No cough.\par Daughter moved to [place], and patient thinking of moving there (had put his house on the market).\par \par

## 2023-08-15 ENCOUNTER — APPOINTMENT (OUTPATIENT)
Dept: FAMILY MEDICINE | Facility: CLINIC | Age: 73
End: 2023-08-15
Payer: MEDICARE

## 2023-08-15 VITALS
WEIGHT: 187 LBS | OXYGEN SATURATION: 99 % | HEIGHT: 65 IN | RESPIRATION RATE: 16 BRPM | SYSTOLIC BLOOD PRESSURE: 131 MMHG | DIASTOLIC BLOOD PRESSURE: 67 MMHG | HEART RATE: 71 BPM | BODY MASS INDEX: 31.16 KG/M2 | TEMPERATURE: 98 F

## 2023-08-15 DIAGNOSIS — R60.9 EDEMA, UNSPECIFIED: ICD-10-CM

## 2023-08-15 DIAGNOSIS — M1A.0390 IDIOPATHIC CHRONIC GOUT, UNSPECIFIED WRIST, W/OUT TOPHUS (TOPHI): ICD-10-CM

## 2023-08-15 PROCEDURE — 99215 OFFICE O/P EST HI 40 MIN: CPT

## 2023-08-15 NOTE — PHYSICAL EXAM
[No Acute Distress] : no acute distress [Well Nourished] : well nourished [Well Developed] : well developed [Well-Appearing] : well-appearing [Normal Sclera/Conjunctiva] : normal sclera/conjunctiva [PERRL] : pupils equal round and reactive to light [EOMI] : extraocular movements intact [Normal Outer Ear/Nose] : the outer ears and nose were normal in appearance [Normal Oropharynx] : the oropharynx was normal [No JVD] : no jugular venous distention [No Lymphadenopathy] : no lymphadenopathy [Supple] : supple [Thyroid Normal, No Nodules] : the thyroid was normal and there were no nodules present [No Respiratory Distress] : no respiratory distress  [No Accessory Muscle Use] : no accessory muscle use [Clear to Auscultation] : lungs were clear to auscultation bilaterally [Normal Rate] : normal rate  [Regular Rhythm] : with a regular rhythm [Normal S1, S2] : normal S1 and S2 [No Murmur] : no murmur heard [No Carotid Bruits] : no carotid bruits [No Abdominal Bruit] : a ~M bruit was not heard ~T in the abdomen [No Varicosities] : no varicosities [Pedal Pulses Present] : the pedal pulses are present [No Edema] : there was no peripheral edema [No Palpable Aorta] : no palpable aorta [No Extremity Clubbing/Cyanosis] : no extremity clubbing/cyanosis [Soft] : abdomen soft [Non Tender] : non-tender [Non-distended] : non-distended [No Masses] : no abdominal mass palpated [No HSM] : no HSM [Normal Bowel Sounds] : normal bowel sounds [Normal Posterior Cervical Nodes] : no posterior cervical lymphadenopathy [Normal Anterior Cervical Nodes] : no anterior cervical lymphadenopathy [No CVA Tenderness] : no CVA  tenderness [No Spinal Tenderness] : no spinal tenderness [No Rash] : no rash [Coordination Grossly Intact] : coordination grossly intact [No Focal Deficits] : no focal deficits [Normal Gait] : normal gait [Deep Tendon Reflexes (DTR)] : deep tendon reflexes were 2+ and symmetric [Normal Affect] : the affect was normal [Normal Insight/Judgement] : insight and judgment were intact [de-identified] : Right wrist swollen and tender

## 2023-08-15 NOTE — ASSESSMENT
[FreeTextEntry1] : Assessment and plan:  1.  Acute exacerbation of gout unfortunately patient has been suffering with hyperuricemia and gouty attack for the past 3 months.  Patient was placed on Uloric due to chronic renal insufficiency which basically is stable patient discontinued medications per cardiology recommendations we will attempt to take care of the acute problem we will try Medrol Dosepak patient will be following up within 2 weeks and then we will structure diet and medications for hyperuricemia.  Patient does have appointment with rheumatology I will discuss case with rheumatologist prior to him seeing her.  2.  Coronary artery disease status post stent x2 patient has stent in RCA and circumflex recently discontinued clopidogrel 75 mg daily but he does continue aspirin 81 mg.  3.  Hypertension patient will continue metoprolol succinate 25 mg daily, amlodipine 10 mg p.o. daily, clonidine 0.1 mg and olmesartan 20 mg today's blood pressure is acceptable.  4.  Chronic kidney disease patient is followed closely by nephrology Dr. Calderon reviewed most recent blood work kidney functions are stable patient will continue good hydration the recommendations are to continue at least 64 ounces of fluids daily to maintain good kidney flow.  5.  Myeloproliferative disorder continue hydroxyurea 500 mg I reviewed most recent hematology note.  6.  Hyperlipidemia continue low-fat low-cholesterol diet and atorvastatin 20 mg p.o. daily.  At next visit I will be obtaining comprehensive blood work total time spent at today's visit face-to-face and non-face-to-face time which included chart preparation and chart review 50 minutes.

## 2023-08-15 NOTE — HEALTH RISK ASSESSMENT
[No] : In the past 12 months have you used drugs other than those required for medical reasons? No [No falls in past year] : Patient reported no falls in the past year [Little interest or pleasure doing things] : 1) Little interest or pleasure doing things [Feeling down, depressed, or hopeless] : 2) Feeling down, depressed, or hopeless [0] : 2) Feeling down, depressed, or hopeless: Not at all (0) [PHQ-2 Negative - No further assessment needed] : PHQ-2 Negative - No further assessment needed [Audit-CScore] : 0 [[id number]] : 0 [With Patient/Caregiver] : , with patient/caregiver [Reviewed no changes] : Reviewed, no changes [Designated Healthcare Proxy] : Designated healthcare proxy [Name: ___] : Health Care Proxy's Name: [unfilled]  [Relationship: ___] : Relationship: [unfilled] [Aggressive treatment] : aggressive treatment [AdvancecareDate] : 08/23 [Former] : Former [20 or more] : 20 or more [> 15 Years] : > 15 Years

## 2023-08-15 NOTE — REVIEW OF SYSTEMS
[Fatigue] : fatigue [Joint Pain] : joint pain [Joint Stiffness] : joint stiffness [Joint Swelling] : joint swelling [Negative] : Heme/Lymph [FreeTextEntry9] : Swollen tender right hand

## 2023-08-15 NOTE — HISTORY OF PRESENT ILLNESS
[FreeTextEntry1] : See HPI. [de-identified] : Headache patient is a 72-year-old gentleman who presents today to reestablish himself as a patient.  Medical history significant for coronary artery disease, hypertension, CKD stage III, hyperuricemia, peripheral edema, myeloproliferative disorder and hyperlipidemia.  Chief complaint today patient has been suffering with hyperuricemia acute gouty attack for several months.  In fact today right wrist is still extremely painful.

## 2023-08-16 ENCOUNTER — APPOINTMENT (OUTPATIENT)
Dept: NEPHROLOGY | Facility: CLINIC | Age: 73
End: 2023-08-16
Payer: MEDICARE

## 2023-08-16 VITALS
HEIGHT: 65 IN | OXYGEN SATURATION: 98 % | TEMPERATURE: 97.6 F | DIASTOLIC BLOOD PRESSURE: 66 MMHG | BODY MASS INDEX: 31.59 KG/M2 | WEIGHT: 189.59 LBS | HEART RATE: 80 BPM | SYSTOLIC BLOOD PRESSURE: 131 MMHG

## 2023-08-16 DIAGNOSIS — M10.9 GOUT, UNSPECIFIED: ICD-10-CM

## 2023-08-16 PROCEDURE — 99215 OFFICE O/P EST HI 40 MIN: CPT

## 2023-08-16 RX ORDER — SODIUM BICARBONATE 650 MG/1
650 TABLET ORAL
Qty: 60 | Refills: 2 | Status: DISCONTINUED | COMMUNITY
Start: 2023-02-21 | End: 2023-08-16

## 2023-08-16 RX ORDER — METHYLPREDNISOLONE 4 MG/1
4 TABLET ORAL
Qty: 21 | Refills: 1 | Status: DISCONTINUED | COMMUNITY
Start: 2023-08-15 | End: 2023-08-16

## 2023-08-16 RX ORDER — FEBUXOSTAT 80 MG/1
80 TABLET ORAL
Qty: 90 | Refills: 3 | Status: DISCONTINUED | COMMUNITY
Start: 2023-07-10 | End: 2023-08-16

## 2023-08-16 RX ORDER — PREDNISONE 20 MG/1
20 TABLET ORAL
Qty: 6 | Refills: 0 | Status: DISCONTINUED | COMMUNITY
Start: 2023-06-21 | End: 2023-08-16

## 2023-08-16 RX ORDER — ATORVASTATIN CALCIUM 20 MG/1
20 TABLET, FILM COATED ORAL
Qty: 90 | Refills: 0 | Status: DISCONTINUED | COMMUNITY
Start: 2022-10-23 | End: 2023-08-16

## 2023-08-16 RX ORDER — CLOPIDOGREL BISULFATE 75 MG/1
75 TABLET, FILM COATED ORAL
Qty: 90 | Refills: 3 | Status: DISCONTINUED | COMMUNITY
Start: 2022-05-03 | End: 2023-08-16

## 2023-08-16 RX ORDER — UBIDECARENONE 10 MG
10 CAPSULE ORAL
Refills: 0 | Status: DISCONTINUED | COMMUNITY
End: 2023-08-16

## 2023-08-16 NOTE — PHYSICAL EXAM
[General Appearance - Alert] : alert [General Appearance - In No Acute Distress] : in no acute distress [General Appearance - Well Nourished] : well nourished [Sclera] : the sclera and conjunctiva were normal [Hearing Threshold Finger Rub Not [place]] : hearing was normal [Neck Appearance] : the appearance of the neck was normal [Neck Cervical Mass (___cm)] : no neck mass was observed [Jugular Venous Distention Increased] : there was no jugular-venous distention [Respiration, Rhythm And Depth] : normal respiratory rhythm and effort [Exaggerated Use Of Accessory Muscles For Inspiration] : no accessory muscle use [Auscultation Breath Sounds / Voice Sounds] : lungs were clear to auscultation bilaterally [Heart Sounds] : normal S1 and S2 [Heart Sounds Gallop] : no gallops [Murmurs] : no murmurs [Heart Sounds Pericardial Friction Rub] : no pericardial rub [___ +] : bilateral [unfilled]+ pretibial pitting edema [Abdomen Soft] : soft [FreeTextEntry1] : right wrist effusion warm to touch with pain on range of motion. [] : no rash [Oriented To Time, Place, And Person] : oriented to person, place, and time [Impaired Insight] : insight and judgment were intact [Affect] : the affect was normal [Mood] : the mood was normal

## 2023-08-16 NOTE — ASSESSMENT
[FreeTextEntry1] : 72 years old man with mild CKD here for follow up evaluation.  Chronic Kidney Disease Stage IIIa -- Secondary to hypertension and partial nephrectomy.  I have reviewed the notes from [facility] including the most recent creatinine which was 1.35 in June 2023.  This is stable CKD.   Hypertension -- The patient's blood pressure is at goal.  He should continue present medications.  His torsemide was held seconary to gout - does not appear volume overloaded agree with holding.    Gout -- physical examination history consistent with gout.  I recommended that he take 30 mg of prednisone for 2 days then 20 for 2 days then 10 for 2 days then stop.  He should also start allopurinol 100 mg po daily.  Advised the patient on risks/benefits/AE of allopurinol and prendisone.  f/u in 6 months.  The time spent is inclusive of the time it took to see, evaluate, and manage the patient.  Time is inclusive of the time taken to review chart, reconcile medications, document findings, and communicate with other providers (when applicable).

## 2023-08-16 NOTE — REVIEW OF SYSTEMS
[Fever] : no fever [Chills] : no chills [Feeling Poorly] : not feeling poorly [Feeling Tired] : not feeling tired [Eyesight Problems] : no eyesight problems [Nosebleeds] : no nosebleeds [Nasal Discharge] : no nasal discharge [Chest Pain] : no chest pain [Palpitations] : no palpitations [Leg Claudication] : no intermittent leg claudication [Lower Ext Edema] : lower extremity edema [Shortness Of Breath] : no shortness of breath [Abdominal Pain] : no abdominal pain [Vomiting] : no vomiting [As Noted in HPI] : as noted in HPI [Arthralgias] : no arthralgias [Dizziness] : no dizziness [Fainting] : no fainting [Anxiety] : no anxiety [Depression] : no depression [Easy Bleeding] : a tendency for easy bleeding [Easy Bruising] : no tendency for easy bruising [FreeTextEntry9] : right wrist pain and swelling erythema

## 2023-08-16 NOTE — HISTORY OF PRESENT ILLNESS
[FreeTextEntry1] : Today I had the pleasure of seeing Drew Morales he was born and raised in [place].  He used to live near [facility] and is a big car and baseball fan.  He now is retired and lives out on [place].  He has had a prior history of partial nephrectomy and hypertension for which he followed with Dr. Ortega.  On presentation today he feels great.  He has no complaints.  He is not SOB he has no NVD.  Urinating is ok.  Sometimes he has a little incontinence but he does not have a sensation of incomplete voiding.  He has recently had some cervical spinal issues and was taking mobic but his creatinine and bp have been stable.  10/18/21 -- Drew is here to see me today after about 2 years due to the pandemic.  He was recently told to come back and see me because of his creatinine, blood pressure, and potassium.  He reports that he continues to follow up with his hematologist for thrombocytosis, continues to follow up with internal medicine.  He recently established care with a cardiologist for erractic blood presure.  He reports that at home his blood pressure is often 160 to 190.  He was started on amlodipine last month and it seems to work but BP still elevated.  He reports a poor diet which consists of eating out daily frequently at fast food restaurants.  No palpitations no SOB.  1/6/21 -- Saw Drew today and recent developments have included addition of metoprolol by Patricia Dill for management of CAD, bladder polyps recurrence and is following with [facility].  He describes no issues with urination no chest pain no shortness of breath but is overall very anxious about his heart and about the findings on his bladder.  6/27/22 -- Saw Drew today had cardiac cath since last visit was done in march and repeat blood work since then was ok.  Follows up with [facility] for prior malignancy, follows up with Laisha and babatunde as well as [facility] for myeloprol disorder.  BP was elevated recently and internist added olmesartan with subsequent improvement. SBP at home mostly 120.  1/9/23 -- Since our last visit Drew reports feeling well.  No chest pain no shortness of breath.  Reports that his blood pressure has been trending up recently.  Has followed up with hematology and medicine.  Recent serum creatinine was 1.3.  He notes some edema and he also reports that he infrequently takes his torsemide because the frequent urination bothers him.  8/16/23 -- since our last visit he is sufferring terribly from gout.  its in his knees ankles wrists.  tried prednisone and it helped a little tried colchicine and it helped but he then go diarrhea.  Overall feels like it is 80% better but still has significant pain in right wrist.

## 2023-08-17 ENCOUNTER — NON-APPOINTMENT (OUTPATIENT)
Age: 73
End: 2023-08-17

## 2023-08-17 ENCOUNTER — APPOINTMENT (OUTPATIENT)
Dept: INTERNAL MEDICINE | Facility: CLINIC | Age: 73
End: 2023-08-17

## 2023-08-17 LAB
ALBUMIN SERPL ELPH-MCNC: 5 G/DL
ANION GAP SERPL CALC-SCNC: 19 MMOL/L
BUN SERPL-MCNC: 20 MG/DL
CALCIUM SERPL-MCNC: 9.8 MG/DL
CHLORIDE SERPL-SCNC: 101 MMOL/L
CO2 SERPL-SCNC: 20 MMOL/L
CREAT SERPL-MCNC: 1.06 MG/DL
CREAT SPEC-SCNC: 24 MG/DL
CREAT/PROT UR: NORMAL RATIO
EGFR: 74 ML/MIN/1.73M2
GLUCOSE SERPL-MCNC: 51 MG/DL
PHOSPHATE SERPL-MCNC: 4.1 MG/DL
POTASSIUM SERPL-SCNC: 5 MMOL/L
PROT UR-MCNC: <4 MG/DL
SODIUM SERPL-SCNC: 141 MMOL/L
URATE SERPL-MCNC: 9 MG/DL

## 2023-08-28 ENCOUNTER — APPOINTMENT (OUTPATIENT)
Dept: FAMILY MEDICINE | Facility: CLINIC | Age: 73
End: 2023-08-28
Payer: MEDICARE

## 2023-08-28 VITALS
DIASTOLIC BLOOD PRESSURE: 58 MMHG | HEART RATE: 73 BPM | SYSTOLIC BLOOD PRESSURE: 124 MMHG | RESPIRATION RATE: 15 BRPM | WEIGHT: 188 LBS | HEIGHT: 65 IN | BODY MASS INDEX: 31.32 KG/M2 | OXYGEN SATURATION: 99 % | TEMPERATURE: 97.2 F

## 2023-08-28 PROCEDURE — 99214 OFFICE O/P EST MOD 30 MIN: CPT

## 2023-08-28 RX ORDER — CIPROFLOXACIN HYDROCHLORIDE 500 MG/1
500 TABLET, FILM COATED ORAL
Qty: 10 | Refills: 0 | Status: COMPLETED | COMMUNITY
Start: 2023-06-07

## 2023-08-28 NOTE — ASSESSMENT
[FreeTextEntry1] : Assessment and plan:  1.  Acute exacerbation of gout patient is feeling better uric acid is still at 9 blood work was done by nephrology I reviewed nephrology's note.  Patient presently has acute exacerbation of gout now affecting his right foot middle toe.  I represcribed prednisone taper patient will continue colchicine 0.6 mg daily and allopurinol 100 mg p.o. daily.  I told him to call me if symptoms persist or change in character.  2.  Coronary artery disease status post stent x2 patient has stent in RCA and circumflex recently discontinued clopidogrel 75 mg daily but he does continue aspirin 81 mg.  3.  Hypertension patient will continue metoprolol succinate 25 mg daily, amlodipine 10 mg p.o. daily, clonidine 0.1 mg and olmesartan 20 mg today's blood pressure is acceptable.  4.  Chronic kidney disease patient is followed closely by nephrology Dr. Calderon reviewed most recent blood work kidney functions are stable patient will continue good hydration the recommendations are to continue at least 64 ounces of fluids daily to maintain good kidney flow.  5.  Myeloproliferative disorder continue hydroxyurea 500 mg I reviewed most recent hematology note.  6.  Hyperlipidemia continue low-fat low-cholesterol diet and atorvastatin 40 mg p.o. daily.

## 2023-08-28 NOTE — PHYSICAL EXAM
[No Acute Distress] : no acute distress [Well Nourished] : well nourished [Well Developed] : well developed [Well-Appearing] : well-appearing [Normal Sclera/Conjunctiva] : normal sclera/conjunctiva [PERRL] : pupils equal round and reactive to light [EOMI] : extraocular movements intact [Normal Outer Ear/Nose] : the outer ears and nose were normal in appearance [Normal Oropharynx] : the oropharynx was normal [No JVD] : no jugular venous distention [No Lymphadenopathy] : no lymphadenopathy [Supple] : supple [Thyroid Normal, No Nodules] : the thyroid was normal and there were no nodules present [No Respiratory Distress] : no respiratory distress  [No Accessory Muscle Use] : no accessory muscle use [Clear to Auscultation] : lungs were clear to auscultation bilaterally [Normal Rate] : normal rate  [Regular Rhythm] : with a regular rhythm [Normal S1, S2] : normal S1 and S2 [No Murmur] : no murmur heard [No Carotid Bruits] : no carotid bruits [No Abdominal Bruit] : a ~M bruit was not heard ~T in the abdomen [No Varicosities] : no varicosities [Pedal Pulses Present] : the pedal pulses are present [No Edema] : there was no peripheral edema [No Palpable Aorta] : no palpable aorta [No Extremity Clubbing/Cyanosis] : no extremity clubbing/cyanosis [Soft] : abdomen soft [Non Tender] : non-tender [Non-distended] : non-distended [No Masses] : no abdominal mass palpated [No HSM] : no HSM [Normal Bowel Sounds] : normal bowel sounds [Normal Posterior Cervical Nodes] : no posterior cervical lymphadenopathy [Normal Anterior Cervical Nodes] : no anterior cervical lymphadenopathy [No CVA Tenderness] : no CVA  tenderness [No Spinal Tenderness] : no spinal tenderness [No Rash] : no rash [Coordination Grossly Intact] : coordination grossly intact [No Focal Deficits] : no focal deficits [Normal Gait] : normal gait [Deep Tendon Reflexes (DTR)] : deep tendon reflexes were 2+ and symmetric [Normal Affect] : the affect was normal [Normal Insight/Judgement] : insight and judgment were intact [de-identified] : Right wrist pain much improved in fact the swelling has resolved now it has affected right middle toe of right  and swollen and red.

## 2023-08-28 NOTE — REVIEW OF SYSTEMS
[Fatigue] : fatigue [Joint Pain] : joint pain [Joint Stiffness] : joint stiffness [Joint Swelling] : joint swelling [Negative] : Heme/Lymph [FreeTextEntry9] : Now localized to right foot right middle toe.

## 2023-08-28 NOTE — HISTORY OF PRESENT ILLNESS
[FreeTextEntry1] : Exacerbation of gout. [de-identified] : Patient is 72-year-old gentleman who presents today for follow-up and disease management.  Patient was recently seen for acute exacerbation of gout medical history is significant for hyperuricemia, ischemic heart disease, hypertension, chronic kidney disease, myeloproliferative disorder and hyperlipidemia.  Chart was reviewed patient was seen by his nephrologist Dr. Calderon and subsequently in the interim he was also seen by his cardiologist.  Patient has been treated with prednisone taper and placed on colchicine 0.6 mg p.o. daily and allopurinol 100 mg p.o. daily.  Patient did have a good response to prednisone but this morning he presents with podagra  middle toe of right foot.

## 2023-09-12 ENCOUNTER — LABORATORY RESULT (OUTPATIENT)
Age: 73
End: 2023-09-12

## 2023-09-20 ENCOUNTER — RX RENEWAL (OUTPATIENT)
Age: 73
End: 2023-09-20

## 2023-11-14 ENCOUNTER — OUTPATIENT (OUTPATIENT)
Dept: OUTPATIENT SERVICES | Facility: HOSPITAL | Age: 73
LOS: 1 days | Discharge: HOME | End: 2023-11-14

## 2023-11-14 DIAGNOSIS — Z85.51 PERSONAL HISTORY OF MALIGNANT NEOPLASM OF BLADDER: Chronic | ICD-10-CM

## 2023-11-14 DIAGNOSIS — Z98.890 OTHER SPECIFIED POSTPROCEDURAL STATES: Chronic | ICD-10-CM

## 2023-11-14 DIAGNOSIS — D47.1 CHRONIC MYELOPROLIFERATIVE DISEASE: ICD-10-CM

## 2023-11-16 ENCOUNTER — LABORATORY RESULT (OUTPATIENT)
Age: 73
End: 2023-11-16

## 2023-11-21 ENCOUNTER — APPOINTMENT (OUTPATIENT)
Dept: RHEUMATOLOGY | Facility: CLINIC | Age: 73
End: 2023-11-21
Payer: MEDICARE

## 2023-11-21 VITALS
HEIGHT: 67 IN | SYSTOLIC BLOOD PRESSURE: 130 MMHG | DIASTOLIC BLOOD PRESSURE: 68 MMHG | OXYGEN SATURATION: 99 % | HEART RATE: 73 BPM | TEMPERATURE: 97.2 F | WEIGHT: 192 LBS | BODY MASS INDEX: 30.13 KG/M2 | RESPIRATION RATE: 16 BRPM

## 2023-11-21 PROCEDURE — 99204 OFFICE O/P NEW MOD 45 MIN: CPT

## 2023-11-21 RX ORDER — COLCHICINE 0.6 MG/1
0.6 TABLET ORAL DAILY
Qty: 90 | Refills: 1 | Status: ACTIVE | COMMUNITY
Start: 2023-08-24 | End: 1900-01-01

## 2023-11-21 RX ORDER — PREDNISONE 10 MG/1
10 TABLET ORAL
Qty: 25 | Refills: 1 | Status: ACTIVE | COMMUNITY
Start: 2023-08-16 | End: 1900-01-01

## 2023-11-27 ENCOUNTER — NON-APPOINTMENT (OUTPATIENT)
Age: 73
End: 2023-11-27

## 2023-11-28 ENCOUNTER — RESULT REVIEW (OUTPATIENT)
Age: 73
End: 2023-11-28

## 2023-11-28 ENCOUNTER — APPOINTMENT (OUTPATIENT)
Dept: HEMATOLOGY ONCOLOGY | Facility: CLINIC | Age: 73
End: 2023-11-28
Payer: MEDICARE

## 2023-11-28 VITALS
DIASTOLIC BLOOD PRESSURE: 71 MMHG | SYSTOLIC BLOOD PRESSURE: 121 MMHG | HEART RATE: 62 BPM | WEIGHT: 189.99 LBS | TEMPERATURE: 97.2 F | BODY MASS INDEX: 29.82 KG/M2 | OXYGEN SATURATION: 99 % | RESPIRATION RATE: 16 BRPM | HEIGHT: 67.01 IN

## 2023-11-28 PROCEDURE — 99214 OFFICE O/P EST MOD 30 MIN: CPT

## 2023-11-30 ENCOUNTER — OUTPATIENT (OUTPATIENT)
Dept: OUTPATIENT SERVICES | Facility: HOSPITAL | Age: 73
LOS: 1 days | End: 2023-11-30
Payer: MEDICARE

## 2023-11-30 ENCOUNTER — APPOINTMENT (OUTPATIENT)
Dept: RADIOLOGY | Facility: HOSPITAL | Age: 73
End: 2023-11-30

## 2023-11-30 ENCOUNTER — APPOINTMENT (OUTPATIENT)
Dept: RADIOLOGY | Facility: HOSPITAL | Age: 73
End: 2023-11-30
Payer: MEDICARE

## 2023-11-30 DIAGNOSIS — M10.9 GOUT, UNSPECIFIED: ICD-10-CM

## 2023-11-30 DIAGNOSIS — Z85.51 PERSONAL HISTORY OF MALIGNANT NEOPLASM OF BLADDER: Chronic | ICD-10-CM

## 2023-11-30 PROCEDURE — 73120 X-RAY EXAM OF HAND: CPT | Mod: 26,50

## 2023-11-30 PROCEDURE — 73620 X-RAY EXAM OF FOOT: CPT | Mod: 26,50

## 2023-11-30 PROCEDURE — 73070 X-RAY EXAM OF ELBOW: CPT | Mod: 26,RT

## 2023-11-30 PROCEDURE — 73120 X-RAY EXAM OF HAND: CPT

## 2023-11-30 PROCEDURE — 73100 X-RAY EXAM OF WRIST: CPT | Mod: 26,50

## 2023-11-30 PROCEDURE — 73620 X-RAY EXAM OF FOOT: CPT

## 2023-11-30 PROCEDURE — 73070 X-RAY EXAM OF ELBOW: CPT

## 2023-11-30 PROCEDURE — 73100 X-RAY EXAM OF WRIST: CPT

## 2024-01-02 ENCOUNTER — LABORATORY RESULT (OUTPATIENT)
Age: 74
End: 2024-01-02

## 2024-01-03 LAB
BASOPHILS # BLD AUTO: 0.78 K/UL
BASOPHILS NFR BLD AUTO: 7 %
EOSINOPHIL # BLD AUTO: 0.1 K/UL
EOSINOPHIL NFR BLD AUTO: 0.9 %
HCT VFR BLD CALC: 53.5 %
HGB BLD-MCNC: 16.4 G/DL
LYMPHOCYTES # BLD AUTO: 2.14 K/UL
LYMPHOCYTES NFR BLD AUTO: 19.1 %
MAN DIFF?: NORMAL
MCHC RBC-ENTMCNC: 30.7 GM/DL
MCHC RBC-ENTMCNC: 31.4 PG
MCV RBC AUTO: 102.3 FL
MONOCYTES # BLD AUTO: 0.1 K/UL
MONOCYTES NFR BLD AUTO: 0.9 %
NEUTROPHILS # BLD AUTO: 7.59 K/UL
NEUTROPHILS NFR BLD AUTO: 66.1 %
PLATELET # BLD AUTO: 352 K/UL
RBC # BLD: 5.23 M/UL
RBC # FLD: 16.7 %
URATE SERPL-MCNC: 5.1 MG/DL
WBC # FLD AUTO: 11.2 K/UL

## 2024-01-12 ENCOUNTER — NON-APPOINTMENT (OUTPATIENT)
Age: 74
End: 2024-01-12

## 2024-01-17 ENCOUNTER — APPOINTMENT (OUTPATIENT)
Dept: FAMILY MEDICINE | Facility: CLINIC | Age: 74
End: 2024-01-17
Payer: MEDICARE

## 2024-01-17 VITALS
OXYGEN SATURATION: 99 % | HEIGHT: 67.01 IN | WEIGHT: 195 LBS | DIASTOLIC BLOOD PRESSURE: 68 MMHG | SYSTOLIC BLOOD PRESSURE: 113 MMHG | HEART RATE: 71 BPM | RESPIRATION RATE: 16 BRPM | TEMPERATURE: 98.3 F | BODY MASS INDEX: 30.61 KG/M2

## 2024-01-17 DIAGNOSIS — E83.52 HYPERCALCEMIA: ICD-10-CM

## 2024-01-17 PROCEDURE — 99214 OFFICE O/P EST MOD 30 MIN: CPT

## 2024-01-17 RX ORDER — ATORVASTATIN CALCIUM 20 MG/1
20 TABLET, FILM COATED ORAL
Qty: 90 | Refills: 3 | Status: ACTIVE | COMMUNITY
Start: 2023-03-03

## 2024-01-17 NOTE — HEALTH RISK ASSESSMENT
[Never (0 pts)] : Never (0 points) [No] : In the past 12 months have you used drugs other than those required for medical reasons? No [No falls in past year] : Patient reported no falls in the past year [Little interest or pleasure doing things] : 1) Little interest or pleasure doing things [Feeling down, depressed, or hopeless] : 2) Feeling down, depressed, or hopeless [0] : 2) Feeling down, depressed, or hopeless: Not at all (0) [PHQ-2 Negative - No further assessment needed] : PHQ-2 Negative - No further assessment needed [Audit-CScore] : 0 [[id number]] : 0 [With Patient/Caregiver] : , with patient/caregiver [Reviewed no changes] : Reviewed, no changes [Designated Healthcare Proxy] : Designated healthcare proxy [Name: ___] : Health Care Proxy's Name: [unfilled]  [Relationship: ___] : Relationship: [unfilled] [Aggressive treatment] : aggressive treatment [AdvancecareDate] : 01/24 [Former] : Former [20 or more] : 20 or more [> 15 Years] : > 15 Years

## 2024-01-17 NOTE — ASSESSMENT
[FreeTextEntry1] : Assessment and plan:  1.  Generalized anxiety presently well-controlled with clonazepam 1 mg it is prescribed as 1 mg every 12 hours as needed patient takes medications as prescribed without any sign of abuse.  I stop checked.  2.  Hypertension with history of chronic renal insufficiency patient's blood pressure is presently under excellent control patient will continue amlodipine 10 mg, clonidine 0.1 mg twice a day, olmesartan 20 mg.  Patient will continue beta-blocker for cardiac health metoprolol succinate ER 25 mg.  3.  Chronic renal insufficiency stage III stable for patient patient keeps himself well-hydrated at least 64 ounces of fluids daily.  4.  Hyperuricemia recently seen by rheumatology presently on colchicine and allopurinol reviewed with patient most recent x-rays.  5.  Hyperlipidemia, mixed patient will continue atorvastatin 20 mg p.o. daily of course patient follows a low-fat low-cholesterol diet.  6.  Total time spent face-to-face and non-face-to-face time 30 minutes the majority of which was spent on counseling and coordination of care.

## 2024-01-17 NOTE — HISTORY OF PRESENT ILLNESS
[FreeTextEntry1] : Follow-up and disease management. [de-identified] : Patient is 73-year-old gentleman who presents today for follow-up and disease management.  Patient was recently seen for acute exacerbation of gout medical history is significant for hyperuricemia, ischemic heart disease, hypertension, chronic kidney disease, myeloproliferative disorder and hyperlipidemia.  Chart was reviewed patient was seen by his nephrologist Dr. Calderon and subsequently in the interim he was also seen by his cardiologist.  Patient has been treated with prednisone taper and placed on colchicine 0.6 mg p.o. daily and allopurinol 100 mg p.o. daily.  Patient did have a good response to prednisone but this morning he presents with podagra  middle toe of right foot.

## 2024-01-17 NOTE — PHYSICAL EXAM
[No Acute Distress] : no acute distress [Well Nourished] : well nourished [Well Developed] : well developed [Well-Appearing] : well-appearing [Normal Sclera/Conjunctiva] : normal sclera/conjunctiva [PERRL] : pupils equal round and reactive to light [EOMI] : extraocular movements intact [Normal Outer Ear/Nose] : the outer ears and nose were normal in appearance [Normal Oropharynx] : the oropharynx was normal [No JVD] : no jugular venous distention [No Lymphadenopathy] : no lymphadenopathy [Supple] : supple [Thyroid Normal, No Nodules] : the thyroid was normal and there were no nodules present [No Respiratory Distress] : no respiratory distress  [No Accessory Muscle Use] : no accessory muscle use [Clear to Auscultation] : lungs were clear to auscultation bilaterally [Normal Rate] : normal rate  [Regular Rhythm] : with a regular rhythm [Normal S1, S2] : normal S1 and S2 [No Murmur] : no murmur heard [No Carotid Bruits] : no carotid bruits [No Abdominal Bruit] : a ~M bruit was not heard ~T in the abdomen [No Varicosities] : no varicosities [Pedal Pulses Present] : the pedal pulses are present [No Edema] : there was no peripheral edema [No Palpable Aorta] : no palpable aorta [No Extremity Clubbing/Cyanosis] : no extremity clubbing/cyanosis [Soft] : abdomen soft [Non Tender] : non-tender [Non-distended] : non-distended [No Masses] : no abdominal mass palpated [No HSM] : no HSM [Normal Bowel Sounds] : normal bowel sounds [Normal Posterior Cervical Nodes] : no posterior cervical lymphadenopathy [Normal Anterior Cervical Nodes] : no anterior cervical lymphadenopathy [No CVA Tenderness] : no CVA  tenderness [No Spinal Tenderness] : no spinal tenderness [No Rash] : no rash [Coordination Grossly Intact] : coordination grossly intact [No Focal Deficits] : no focal deficits [Normal Gait] : normal gait [Deep Tendon Reflexes (DTR)] : deep tendon reflexes were 2+ and symmetric [Speech Grossly Normal] : speech grossly normal [Memory Grossly Normal] : memory grossly normal [Normal Affect] : the affect was normal [Alert and Oriented x3] : oriented to person, place, and time [Normal Mood] : the mood was normal [Normal Insight/Judgement] : insight and judgment were intact [Comprehensive Foot Exam Normal] : Right and left foot were examined and both feet are normal. No ulcers in either foot. Toes are normal and with full ROM.  Normal tactile sensation with monofilament testing throughout both feet [de-identified] : Right wrist pain much improved in fact the swelling has resolved now it has affected right middle toe of right  and swollen and red.

## 2024-02-08 ENCOUNTER — APPOINTMENT (OUTPATIENT)
Dept: NEPHROLOGY | Facility: CLINIC | Age: 74
End: 2024-02-08
Payer: MEDICARE

## 2024-02-08 VITALS
WEIGHT: 195 LBS | SYSTOLIC BLOOD PRESSURE: 162 MMHG | BODY MASS INDEX: 30.61 KG/M2 | DIASTOLIC BLOOD PRESSURE: 80 MMHG | HEART RATE: 84 BPM | TEMPERATURE: 97.9 F | OXYGEN SATURATION: 95 % | HEIGHT: 67 IN

## 2024-02-08 VITALS — DIASTOLIC BLOOD PRESSURE: 70 MMHG | SYSTOLIC BLOOD PRESSURE: 145 MMHG

## 2024-02-08 VITALS — DIASTOLIC BLOOD PRESSURE: 71 MMHG | SYSTOLIC BLOOD PRESSURE: 145 MMHG

## 2024-02-08 DIAGNOSIS — N18.30 CHRONIC KIDNEY DISEASE, STAGE 3 UNSPECIFIED: ICD-10-CM

## 2024-02-08 DIAGNOSIS — N18.31 CHRONIC KIDNEY DISEASE, STAGE 3A: ICD-10-CM

## 2024-02-08 PROCEDURE — G2211 COMPLEX E/M VISIT ADD ON: CPT

## 2024-02-08 PROCEDURE — 99214 OFFICE O/P EST MOD 30 MIN: CPT

## 2024-02-08 NOTE — ASSESSMENT
[FreeTextEntry1] : 73 years old man with mild CKD here for follow up evaluation.  Chronic Kidney Disease Stage IIIa -- Secondary to hypertension and partial nephrectomy. Recent notes reviewed. Most recent creatinine which is 1.03 (Jan 13, 2024).  This is stable CKD.   Hypertension -- The patient's blood pressure is elevated today 145/71 -had salt intake last night.. Asked to monitor BP at home daily and keep a log for 1-2 weeks and call us with numbers -will decide if pt needs any adjustment to BP meds.  He should continue present medications.  His torsemide was held secondary to gout - does have trace pedal edema during today's visit. Lungs clear.     Gout -- physical examination history consistent with gout.  S/p 30 mg of prednisone for 2 days then 20 for 2 days then 10 for 2 days then stopped in August 2023. Now no more episodes since then. Currently stable on Allopurinol 300 mg po daily and recently started on Colchicine 0.6 mg daily.    Call back in 1-2 weeks with BP log -will decide if BP meds adjustment if needed.  F/u in 6 months.  The time spent is inclusive of the time it took to see, evaluate, and manage the patient.  Time is inclusive of the time taken to review chart, reconcile medications, document findings, and communicate with other providers (when applicable).

## 2024-02-08 NOTE — PHYSICAL EXAM
[General Appearance - Alert] : alert [General Appearance - In No Acute Distress] : in no acute distress [General Appearance - Well Nourished] : well nourished [Sclera] : the sclera and conjunctiva were normal [Hearing Threshold Finger Rub Not Morrison] : hearing was normal [Neck Appearance] : the appearance of the neck was normal [Neck Cervical Mass (___cm)] : no neck mass was observed [Jugular Venous Distention Increased] : there was no jugular-venous distention [Respiration, Rhythm And Depth] : normal respiratory rhythm and effort [Exaggerated Use Of Accessory Muscles For Inspiration] : no accessory muscle use [Auscultation Breath Sounds / Voice Sounds] : lungs were clear to auscultation bilaterally [Heart Sounds] : normal S1 and S2 [Heart Sounds Gallop] : no gallops [Murmurs] : no murmurs [Heart Sounds Pericardial Friction Rub] : no pericardial rub [___ +] : bilateral [unfilled]+ pretibial pitting edema [Abdomen Soft] : soft [] : no rash [Oriented To Time, Place, And Person] : oriented to person, place, and time [Impaired Insight] : insight and judgment were intact [Affect] : the affect was normal [Mood] : the mood was normal [FreeTextEntry1] : right wrist effusion warm to touch with pain on range of motion.

## 2024-02-08 NOTE — REVIEW OF SYSTEMS
[Lower Ext Edema] : lower extremity edema [As Noted in HPI] : as noted in HPI [Fever] : no fever [Chills] : no chills [Feeling Poorly] : not feeling poorly [Feeling Tired] : not feeling tired [Eyesight Problems] : no eyesight problems [Nosebleeds] : no nosebleeds [Nasal Discharge] : no nasal discharge [Chest Pain] : no chest pain [Palpitations] : no palpitations [Leg Claudication] : no intermittent leg claudication [Shortness Of Breath] : no shortness of breath [Abdominal Pain] : no abdominal pain [Vomiting] : no vomiting [Arthralgias] : no arthralgias [Dizziness] : no dizziness [Fainting] : no fainting [Anxiety] : no anxiety [Depression] : no depression [Easy Bleeding] : no tendency for easy bleeding [Easy Bruising] : no tendency for easy bruising [FreeTextEntry9] : right wrist pain and swelling erythema

## 2024-02-08 NOTE — HISTORY OF PRESENT ILLNESS
[FreeTextEntry1] : Today I had the pleasure of seeing Drew Morales he was born and raised in [place].  He used to live near [facility] and is a big car and baseball fan.  He now is retired and lives out on [address].  He has had a prior history of partial nephrectomy and hypertension for which he followed with Dr. Ortega.  On presentation today he feels great.  He has no complaints.  He is not SOB he has no NVD.  Urinating is ok.  Sometimes he has a little incontinence but he does not have a sensation of incomplete voiding.  He has recently had some cervical spinal issues and was taking mobic but his creatinine and bp have been stable.  10/18/21 -- Drew is here to see me today after about 2 years due to the pandemic.  He was recently told to come back and see me because of his creatinine, blood pressure, and potassium.  He reports that he continues to follow up with his hematologist for thrombocytosis, continues to follow up with internal medicine.  He recently established care with a cardiologist for erractic blood presure.  He reports that at home his blood pressure is often 160 to 190.  He was started on amlodipine last month and it seems to work but BP still elevated.  He reports a poor diet which consists of eating out daily frequently at fast food restaurants.  No palpitations no SOB.  1/6/21 -- Saw Drew today and recent developments have included addition of metoprolol by Patricia Dill for management of CAD, bladder polyps recurrence and is following with [facility].  He describes no issues with urination no chest pain no shortness of breath but is overall very anxious about his heart and about the findings on his bladder.  6/27/22 -- Saw Drew today had cardiac cath since last visit was done in march and repeat blood work since then was ok.  Follows up with [facility] for prior malignancy, follows up with Laisha and babatunde as well as [facility] for myeloprol disorder.  BP was elevated recently and internist added olmesartan with subsequent improvement. SBP at home mostly 120.  1/9/23 -- Since our last visit Drew reports feeling well.  No chest pain no shortness of breath.  Reports that his blood pressure has been trending up recently.  Has followed up with hematology and medicine.  Recent serum creatinine was 1.3.  He notes some edema and he also reports that he infrequently takes his torsemide because the frequent urination bothers him.  8/16/23 -- since our last visit he is sufferring terribly from gout.  its in his knees ankles wrists.  tried prednisone and it helped a little tried colchicine and it helped but he then go diarrhea.  Overall feels like it is 80% better but still has significant pain in right wrist.  2/8/2024 -- today here for follow up. Gout under control since last year august. Went to a restaurant and ate really salty food. BP in office today elevated 145/71. Compliant with medications. No recent hospitalizations.

## 2024-02-08 NOTE — END OF VISIT
[] : Fellow [FreeTextEntry3] : Agree with above [Time Spent: ___ minutes] : I have spent [unfilled] minutes of time on the encounter.

## 2024-02-08 NOTE — DISCUSSION/SUMMARY
[FreeTextEntry1] : spoke with the patient creatinine stable. feeling better continue prednisone for gout.  feels well (log glucose probably error).  recommend blood work repeat uric acid before next visit.

## 2024-02-21 ENCOUNTER — OUTPATIENT (OUTPATIENT)
Dept: OUTPATIENT SERVICES | Facility: HOSPITAL | Age: 74
LOS: 1 days | Discharge: HOME | End: 2024-02-21

## 2024-02-21 DIAGNOSIS — Z98.890 OTHER SPECIFIED POSTPROCEDURAL STATES: Chronic | ICD-10-CM

## 2024-02-21 DIAGNOSIS — D47.1 CHRONIC MYELOPROLIFERATIVE DISEASE: ICD-10-CM

## 2024-02-21 DIAGNOSIS — Z85.51 PERSONAL HISTORY OF MALIGNANT NEOPLASM OF BLADDER: Chronic | ICD-10-CM

## 2024-02-22 ENCOUNTER — APPOINTMENT (OUTPATIENT)
Dept: RHEUMATOLOGY | Facility: CLINIC | Age: 74
End: 2024-02-22
Payer: MEDICARE

## 2024-02-22 VITALS
HEIGHT: 67 IN | TEMPERATURE: 98.2 F | WEIGHT: 199 LBS | OXYGEN SATURATION: 99 % | RESPIRATION RATE: 16 BRPM | BODY MASS INDEX: 31.23 KG/M2 | DIASTOLIC BLOOD PRESSURE: 70 MMHG | HEART RATE: 78 BPM | SYSTOLIC BLOOD PRESSURE: 152 MMHG

## 2024-02-22 PROCEDURE — G2211 COMPLEX E/M VISIT ADD ON: CPT

## 2024-02-22 PROCEDURE — 99214 OFFICE O/P EST MOD 30 MIN: CPT

## 2024-02-22 RX ORDER — ALLOPURINOL 300 MG/1
300 TABLET ORAL DAILY
Qty: 90 | Refills: 1 | Status: ACTIVE | COMMUNITY
Start: 2024-02-22 | End: 1900-01-01

## 2024-02-23 NOTE — PHYSICAL EXAM
[General Appearance - Alert] : alert [General Appearance - Well Nourished] : well nourished [General Appearance - In No Acute Distress] : in no acute distress [Outer Ear] : the ears and nose were normal in appearance [Neck Appearance] : the appearance of the neck was normal [No Spinal Tenderness] : no spinal tenderness [Abnormal Walk] : normal gait [Nail Clubbing] : no clubbing  or cyanosis of the fingernails [Musculoskeletal - Swelling] : no joint swelling seen [Motor Tone] : muscle strength and tone were normal [] : no rash [No Focal Deficits] : no focal deficits [Oriented To Time, Place, And Person] : oriented to person, place, and time [Impaired Insight] : insight and judgment were intact [Affect] : the affect was normal [FreeTextEntry1] : No synovitis or effusions, improved R index finger ROM and smaller likely tophi, chronic R elbow contracture

## 2024-02-23 NOTE — ASSESSMENT
[FreeTextEntry1] : NOAH LAGUNAS is a 73 year old man with below --   # Recurrent polyarticular gout flares in last few months, likely multifactorial etiology - aging, CKD, prior diuretic use, hematologic process causing persistent leukocytosis/ thrombocytosis. Marked improvement s/p ULT initiation, no flares, tophi appears smaller  - c/w allopurinol 300mg  - sUA in range, repeat prior to next visit along with CMP given hx of CKD - XRays reviewed with him - some OA changes, no gouty erosive changes  - c/w colchicine but will taper 2/2 CKD and statin use - 3x/week x 2 weeks, 2x/week x 2 weeks then can stop. Keep remaining supply - can use 2 tabs at first sign of flare then 1 tab daily until resolved. Also has prednisone taper provided prn flare not responding to colchicine  - c/w increased PO hydration and avoidance of trigger foods - many of which he is already limiting - red meat, ETOH, shellfish   # mild, diffuse OA - topical Voltaren gel prn pain up to TID to affected peripheral joints for OA -related pain  - tylenol prn, avoid NSAIDS 2/2 CKD  - hand ROM exercises reviewed   All questions and concerns addressed to my best possible ability, patient verbalizes understanding and is agreeable. RTC 3 months

## 2024-02-23 NOTE — HISTORY OF PRESENT ILLNESS
[FreeTextEntry1] : NOAH LAGUNAS is a 72 year old man who presents with severe polyarticular flares since this summer, was taken off torsemide as ?contributing. Responsive to prednisone tapers but some lingering arthralgias at some of the sites - R wrist, bottom of feet, R ankle. No gout prior, no clear food triggers, no FH of gout, Ortho last year noted ?tophi on R 2nd DIP but no treatment as wasn't having clear flares, same for his chronic hyperuricemia likely 2/2 chronic mild CKD. Currently off prednisone but on allopurinol 100mg and colchicine 0.6mg daily - tolerating well, diarrhea with higher colchicine doses. Arthralgias 2/10 on pain scale, ROM intact at present.   Labs - sUA 9 Negative - RF/CCP   ------ 2/22/24 -- No gout flares since last visit, improved R index finger ROM and smaller DIP nodule. Tolerating meds well.

## 2024-02-23 NOTE — QUALITY MEASURES
[In the process of titrating urate] : patient in the process of titrating urate lowering therapy [Has achieved uric acid goal] : patient has achieved uric acid goal

## 2024-03-11 ENCOUNTER — LABORATORY RESULT (OUTPATIENT)
Age: 74
End: 2024-03-11

## 2024-03-11 LAB
ALBUMIN SERPL ELPH-MCNC: 4.9 G/DL
ALBUMIN SERPL ELPH-MCNC: 4.9 G/DL
ALP BLD-CCNC: 76 U/L
ALT SERPL-CCNC: 28 U/L
ANION GAP SERPL CALC-SCNC: 13 MMOL/L
AST SERPL-CCNC: 25 U/L
BILIRUB DIRECT SERPL-MCNC: 0.2 MG/DL
BILIRUB INDIRECT SERPL-MCNC: 0.7 MG/DL
BILIRUB SERPL-MCNC: 1 MG/DL
BUN SERPL-MCNC: 20 MG/DL
CALCIUM SERPL-MCNC: 10 MG/DL
CHLORIDE SERPL-SCNC: 99 MMOL/L
CO2 SERPL-SCNC: 28 MMOL/L
CREAT SERPL-MCNC: 1.1 MG/DL
EGFR: 70 ML/MIN/1.73M2
GLUCOSE SERPL-MCNC: 120 MG/DL
PHOSPHATE SERPL-MCNC: 4 MG/DL
POTASSIUM SERPL-SCNC: 5.5 MMOL/L
PROT SERPL-MCNC: 6.9 G/DL
SODIUM SERPL-SCNC: 141 MMOL/L

## 2024-03-12 LAB
BASOPHILS # BLD AUTO: 0.7 K/UL
BASOPHILS NFR BLD AUTO: 3.4 %
EOSINOPHIL # BLD AUTO: 0.7 K/UL
EOSINOPHIL NFR BLD AUTO: 3.4 %
HCT VFR BLD CALC: 53.1 %
HGB BLD-MCNC: 16.6 G/DL
LYMPHOCYTES # BLD AUTO: 2.09 K/UL
LYMPHOCYTES NFR BLD AUTO: 10.2 %
MAN DIFF?: NORMAL
MCHC RBC-ENTMCNC: 31.3 GM/DL
MCHC RBC-ENTMCNC: 31.6 PG
MCV RBC AUTO: 101 FL
MONOCYTES # BLD AUTO: 0.7 K/UL
MONOCYTES NFR BLD AUTO: 3.4 %
NEUTROPHILS # BLD AUTO: 15.58 K/UL
NEUTROPHILS NFR BLD AUTO: 73.7 %
PLATELET # BLD AUTO: 448 K/UL
RBC # BLD: 5.26 M/UL
RBC # FLD: 18.5 %
WBC # FLD AUTO: 20.45 K/UL

## 2024-03-21 ENCOUNTER — APPOINTMENT (OUTPATIENT)
Dept: HEMATOLOGY ONCOLOGY | Facility: CLINIC | Age: 74
End: 2024-03-21
Payer: MEDICARE

## 2024-03-21 VITALS
TEMPERATURE: 96.8 F | RESPIRATION RATE: 16 BRPM | HEART RATE: 99 BPM | OXYGEN SATURATION: 99 % | BODY MASS INDEX: 30.76 KG/M2 | SYSTOLIC BLOOD PRESSURE: 174 MMHG | DIASTOLIC BLOOD PRESSURE: 77 MMHG | HEIGHT: 67 IN | WEIGHT: 195.99 LBS

## 2024-03-21 PROCEDURE — G2211 COMPLEX E/M VISIT ADD ON: CPT

## 2024-03-21 PROCEDURE — 99214 OFFICE O/P EST MOD 30 MIN: CPT

## 2024-03-21 NOTE — PHYSICAL EXAM
[Fully active, able to carry on all pre-disease performance without restriction] : Status 0 - Fully active, able to carry on all pre-disease performance without restriction [Normal] : affect appropriate [de-identified] : non-toxic appearing [de-identified] : right first finger and wrist soft tissue swelling-no erythema or tenderness [de-identified] : soft, NT; spleen tip palpable LUQ; +ventral hernia, NT

## 2024-03-21 NOTE — CONSULT LETTER
[Dear  ___] : Dear  [unfilled], [Courtesy Letter:] : I had the pleasure of seeing your patient, [unfilled], in my office today. [Consult Closing:] : Thank you very much for allowing me to participate in the care of this patient.  If you have any questions, please do not hesitate to contact me. [Please see my note below.] : Please see my note below. [Sincerely,] : Sincerely, [FreeTextEntry3] : Katie Fernandez MD

## 2024-03-21 NOTE — HISTORY OF PRESENT ILLNESS
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 Flow cytometry of the bone marrow aspirate lymphocytes showed no diagnostic abnormalities. Reticulin stain showed a slight increase and trichrome stain was negative for collagen (grade 1 fibrosis). Bone marrow aspirate iron stain showed iron stores present with ringed sideroblasts (less than 15% of cells). Quantitative BCR-ABL by RT-PCR negative. Cytogenetics showed a normal male karyotype. MDS FISH panel negative. Maintained on Hydrea therapy.\par  \par  History of renal cancer 10/2014-stage I(pT1a), status post right partial nephrectomy\par  \par  History of bladder cancer (noninvasive) 1994- status post TURBT. [de-identified] : BP being adjusted by renal MD. Sees rheumatologist-has gouty arthritis. Taking allopurinol and tapering off colchicine. Follows regularly with urologist (Dr. Valadez) for bladder tumors ([facility])-has cystoscopy Q 4 months. No current bleeding. Sees nephrologist Dr. Calderon, for kidney disease, hyperkalemia.  Sees cardiologist for BP management.  No fevers. No complaints of headache, pruritus, chest pain, shortness of breath, abdominal/pelvic pain. No cough. Daughter moved to [place], and patient was thinking of moving there, but is now deferring this.

## 2024-04-17 ENCOUNTER — APPOINTMENT (OUTPATIENT)
Dept: FAMILY MEDICINE | Facility: CLINIC | Age: 74
End: 2024-04-17
Payer: MEDICARE

## 2024-04-17 ENCOUNTER — LABORATORY RESULT (OUTPATIENT)
Age: 74
End: 2024-04-17

## 2024-04-17 VITALS — SYSTOLIC BLOOD PRESSURE: 128 MMHG | DIASTOLIC BLOOD PRESSURE: 72 MMHG

## 2024-04-17 VITALS
HEART RATE: 80 BPM | OXYGEN SATURATION: 99 % | RESPIRATION RATE: 16 BRPM | DIASTOLIC BLOOD PRESSURE: 72 MMHG | SYSTOLIC BLOOD PRESSURE: 152 MMHG | HEIGHT: 67 IN | TEMPERATURE: 98.1 F | BODY MASS INDEX: 30.76 KG/M2 | WEIGHT: 196 LBS

## 2024-04-17 DIAGNOSIS — Z95.5 PRESENCE OF CORONARY ANGIOPLASTY IMPLANT AND GRAFT: ICD-10-CM

## 2024-04-17 DIAGNOSIS — I25.10 ATHEROSCLEROTIC HEART DISEASE OF NATIVE CORONARY ARTERY W/OUT ANGINA PECTORIS: ICD-10-CM

## 2024-04-17 DIAGNOSIS — M13.0 POLYARTHRITIS, UNSPECIFIED: ICD-10-CM

## 2024-04-17 DIAGNOSIS — I10 ESSENTIAL (PRIMARY) HYPERTENSION: ICD-10-CM

## 2024-04-17 DIAGNOSIS — E79.0 HYPERURICEMIA W/OUT SIGNS OF INFLAMMATORY ARTHRITIS AND TOPHACEOUS DISEASE: ICD-10-CM

## 2024-04-17 DIAGNOSIS — E78.5 HYPERLIPIDEMIA, UNSPECIFIED: ICD-10-CM

## 2024-04-17 DIAGNOSIS — D47.1 CHRONIC MYELOPROLIFERATIVE DISEASE: ICD-10-CM

## 2024-04-17 DIAGNOSIS — F41.9 ANXIETY DISORDER, UNSPECIFIED: ICD-10-CM

## 2024-04-17 DIAGNOSIS — D72.829 ELEVATED WHITE BLOOD CELL COUNT, UNSPECIFIED: ICD-10-CM

## 2024-04-17 PROCEDURE — 36415 COLL VENOUS BLD VENIPUNCTURE: CPT

## 2024-04-17 PROCEDURE — 99214 OFFICE O/P EST MOD 30 MIN: CPT

## 2024-04-17 NOTE — PHYSICAL EXAM
[No Acute Distress] : no acute distress [Well Nourished] : well nourished [Well Developed] : well developed [Well-Appearing] : well-appearing [Normal Sclera/Conjunctiva] : normal sclera/conjunctiva [PERRL] : pupils equal round and reactive to light [EOMI] : extraocular movements intact [Normal Outer Ear/Nose] : the outer ears and nose were normal in appearance [Normal Oropharynx] : the oropharynx was normal [No JVD] : no jugular venous distention [No Lymphadenopathy] : no lymphadenopathy [Supple] : supple [Thyroid Normal, No Nodules] : the thyroid was normal and there were no nodules present [No Respiratory Distress] : no respiratory distress  [No Accessory Muscle Use] : no accessory muscle use [Clear to Auscultation] : lungs were clear to auscultation bilaterally [Normal Rate] : normal rate  [Regular Rhythm] : with a regular rhythm [Normal S1, S2] : normal S1 and S2 [No Murmur] : no murmur heard [No Carotid Bruits] : no carotid bruits [No Abdominal Bruit] : a ~M bruit was not heard ~T in the abdomen [No Varicosities] : no varicosities [Pedal Pulses Present] : the pedal pulses are present [No Edema] : there was no peripheral edema [No Palpable Aorta] : no palpable aorta [No Extremity Clubbing/Cyanosis] : no extremity clubbing/cyanosis [Soft] : abdomen soft [Non Tender] : non-tender [Non-distended] : non-distended [No Masses] : no abdominal mass palpated [No HSM] : no HSM [Normal Bowel Sounds] : normal bowel sounds [Normal Posterior Cervical Nodes] : no posterior cervical lymphadenopathy [Normal Anterior Cervical Nodes] : no anterior cervical lymphadenopathy [No CVA Tenderness] : no CVA  tenderness [No Spinal Tenderness] : no spinal tenderness [No Rash] : no rash [Coordination Grossly Intact] : coordination grossly intact [No Focal Deficits] : no focal deficits [Normal Gait] : normal gait [Deep Tendon Reflexes (DTR)] : deep tendon reflexes were 2+ and symmetric [Speech Grossly Normal] : speech grossly normal [Memory Grossly Normal] : memory grossly normal [Normal Affect] : the affect was normal [Alert and Oriented x3] : oriented to person, place, and time [Normal Mood] : the mood was normal [Normal Insight/Judgement] : insight and judgment were intact [Comprehensive Foot Exam Normal] : Right and left foot were examined and both feet are normal. No ulcers in either foot. Toes are normal and with full ROM.  Normal tactile sensation with monofilament testing throughout both feet [de-identified] : Right wrist pain much improved in fact the swelling has resolved now it has affected right middle toe of right  and swollen and red.

## 2024-04-17 NOTE — ASSESSMENT
[FreeTextEntry1] : Assessment and plan:  1.  Generalized anxiety presently well-controlled with clonazepam 1 mg it is prescribed as 1 mg every 12 hours as needed patient takes medications as prescribed without any sign of abuse.  I stop checked.  2.  Hypertension with history of chronic renal insufficiency patient's blood pressure is presently under excellent control patient will continue amlodipine 10 mg, clonidine 0.1 mg twice a day, olmesartan 20 mg.  Patient will continue beta-blocker for cardiac health metoprolol succinate ER 25 mg.  Blood pressure 1 first taken was slightly elevated at 152/70 2 repeat blood pressure which was taken 35 minutes later showed that the blood pressure dropped to 128/72 patient brought in home blood pressure log compared home blood pressure today's blood pressure stable for patient I recommend no change in medical management.  3.  Chronic renal insufficiency stage III stable for patient patient keeps himself well-hydrated at least 64 ounces of fluids daily.  4.  Hyperuricemia continue allopurinol 300 mg p.o. daily comprehensive blood work drawn in office by examiner I will be checking uric acid levels.  5.  Hyperlipidemia, mixed patient will continue atorvastatin 20 mg p.o. daily of course patient follows a low-fat low-cholesterol diet.  6.  Total time spent face-to-face and non-face-to-face time 35 minutes the majority of which was spent on counseling and coordination of care.

## 2024-04-17 NOTE — HISTORY OF PRESENT ILLNESS
[FreeTextEntry1] : Follow-up, disease management, diffuse joint pain, follow-up blood work. [de-identified] : Patient is 73-year-old gentleman who presents today for follow-up and disease management.  Patient was recently seen for acute exacerbation of gout medical history is significant for hyperuricemia, ischemic heart disease, hypertension, chronic kidney disease, myeloproliferative disorder and hyperlipidemia.  Chart was reviewed patient was seen by his nephrologist Dr. Calderon and subsequently in the interim he was also seen by his cardiologist.  Patient has been treated with prednisone taper and placed on colchicine 0.6 mg p.o. daily and allopurinol 100 mg p.o. daily.  Patient did have a good response to prednisone but this morning he presents with podagra  middle toe of right foot.

## 2024-04-17 NOTE — HEALTH RISK ASSESSMENT
[No] : In the past 12 months have you used drugs other than those required for medical reasons? No [No falls in past year] : Patient reported no falls in the past year [Little interest or pleasure doing things] : 1) Little interest or pleasure doing things [Feeling down, depressed, or hopeless] : 2) Feeling down, depressed, or hopeless [0] : 2) Feeling down, depressed, or hopeless: Not at all (0) [PHQ-2 Negative - No further assessment needed] : PHQ-2 Negative - No further assessment needed [Audit-CScore] : 0 [[id number]] : 0 [With Patient/Caregiver] : , with patient/caregiver [Reviewed no changes] : Reviewed, no changes [Designated Healthcare Proxy] : Designated healthcare proxy [Relationship: ___] : Relationship: [unfilled] [Aggressive treatment] : aggressive treatment [I will adhere to the patient's wishes.] : I will adhere to the patient's wishes. [AdvancecareDate] : 04/24 [Former] : Former [20 or more] : 20 or more [> 15 Years] : > 15 Years

## 2024-04-18 LAB
ALBUMIN SERPL ELPH-MCNC: 5.2 G/DL
ALP BLD-CCNC: 87 U/L
ALT SERPL-CCNC: 30 U/L
ANION GAP SERPL CALC-SCNC: 14 MMOL/L
AST SERPL-CCNC: 28 U/L
BASOPHILS # BLD AUTO: 0.86 K/UL
BASOPHILS NFR BLD AUTO: 4.3 %
BILIRUB SERPL-MCNC: 0.8 MG/DL
BUN SERPL-MCNC: 18 MG/DL
CALCIUM SERPL-MCNC: 10.2 MG/DL
CALCIUM SERPL-MCNC: 10.2 MG/DL
CHLORIDE SERPL-SCNC: 101 MMOL/L
CHOLEST SERPL-MCNC: 122 MG/DL
CO2 SERPL-SCNC: 25 MMOL/L
CREAT SERPL-MCNC: 1.04 MG/DL
EGFR: 75 ML/MIN/1.73M2
EOSINOPHIL # BLD AUTO: 0.16 K/UL
EOSINOPHIL NFR BLD AUTO: 0.8 %
ESTIMATED AVERAGE GLUCOSE: 157 MG/DL
GLUCOSE SERPL-MCNC: 128 MG/DL
HBA1C MFR BLD HPLC: 7.1 %
HCT VFR BLD CALC: 50 %
HDLC SERPL-MCNC: 31 MG/DL
HGB BLD-MCNC: 15.9 G/DL
LDLC SERPL CALC-MCNC: 62 MG/DL
LYMPHOCYTES # BLD AUTO: 1.04 K/UL
LYMPHOCYTES NFR BLD AUTO: 5.2 %
MAN DIFF?: NORMAL
MCHC RBC-ENTMCNC: 31.8 GM/DL
MCHC RBC-ENTMCNC: 32.6 PG
MCV RBC AUTO: 102.7 FL
MONOCYTES # BLD AUTO: 0.34 K/UL
MONOCYTES NFR BLD AUTO: 1.7 %
NEUTROPHILS # BLD AUTO: 16.93 K/UL
NEUTROPHILS NFR BLD AUTO: 84.5 %
NONHDLC SERPL-MCNC: 91 MG/DL
PARATHYROID HORMONE INTACT: 23 PG/ML
PLATELET # BLD AUTO: 454 K/UL
POTASSIUM SERPL-SCNC: 4.6 MMOL/L
PROT SERPL-MCNC: 7.1 G/DL
RBC # BLD: 4.87 M/UL
RBC # FLD: 18.8 %
SODIUM SERPL-SCNC: 140 MMOL/L
TRIGL SERPL-MCNC: 173 MG/DL
URATE SERPL-MCNC: 4.9 MG/DL
WBC # FLD AUTO: 20.04 K/UL

## 2024-05-16 ENCOUNTER — LABORATORY RESULT (OUTPATIENT)
Age: 74
End: 2024-05-16

## 2024-05-17 LAB
ALBUMIN SERPL ELPH-MCNC: 5 G/DL
ALP BLD-CCNC: 65 U/L
ALT SERPL-CCNC: 24 U/L
ANION GAP SERPL CALC-SCNC: 12 MMOL/L
AST SERPL-CCNC: 23 U/L
BASOPHILS # BLD AUTO: 0.23 K/UL
BASOPHILS NFR BLD AUTO: 1.7 %
BILIRUB SERPL-MCNC: 1 MG/DL
BUN SERPL-MCNC: 25 MG/DL
CALCIUM SERPL-MCNC: 10.3 MG/DL
CHLORIDE SERPL-SCNC: 102 MMOL/L
CO2 SERPL-SCNC: 26 MMOL/L
CREAT SERPL-MCNC: 1.31 MG/DL
EGFR: 57 ML/MIN/1.73M2
EOSINOPHIL # BLD AUTO: 0.24 K/UL
EOSINOPHIL NFR BLD AUTO: 1.8 %
GLUCOSE SERPL-MCNC: 116 MG/DL
HCT VFR BLD CALC: 42.7 %
HGB BLD-MCNC: 14.2 G/DL
IMM GRANULOCYTES NFR BLD AUTO: 4.7 %
LYMPHOCYTES # BLD AUTO: 1.29 K/UL
LYMPHOCYTES NFR BLD AUTO: 9.8 %
MAN DIFF?: NORMAL
MCHC RBC-ENTMCNC: 33.3 GM/DL
MCHC RBC-ENTMCNC: 34.1 PG
MCV RBC AUTO: 102.4 FL
MONOCYTES # BLD AUTO: 0.62 K/UL
MONOCYTES NFR BLD AUTO: 4.7 %
NEUTROPHILS # BLD AUTO: 10.15 K/UL
NEUTROPHILS NFR BLD AUTO: 77.3 %
PLATELET # BLD AUTO: 374 K/UL
POTASSIUM SERPL-SCNC: 5.5 MMOL/L
PROT SERPL-MCNC: 6.7 G/DL
RBC # BLD: 4.17 M/UL
RBC # FLD: 19.7 %
SODIUM SERPL-SCNC: 140 MMOL/L
URATE SERPL-MCNC: 5 MG/DL
WBC # FLD AUTO: 13.15 K/UL

## 2024-05-22 ENCOUNTER — APPOINTMENT (OUTPATIENT)
Dept: RHEUMATOLOGY | Facility: CLINIC | Age: 74
End: 2024-05-22
Payer: MEDICARE

## 2024-05-22 VITALS
RESPIRATION RATE: 15 BRPM | TEMPERATURE: 97.6 F | HEART RATE: 75 BPM | DIASTOLIC BLOOD PRESSURE: 72 MMHG | OXYGEN SATURATION: 97 % | BODY MASS INDEX: 29.51 KG/M2 | SYSTOLIC BLOOD PRESSURE: 120 MMHG | WEIGHT: 188 LBS | HEIGHT: 67 IN

## 2024-05-22 DIAGNOSIS — M15.9 POLYOSTEOARTHRITIS, UNSPECIFIED: ICD-10-CM

## 2024-05-22 DIAGNOSIS — M25.572 PAIN IN LEFT ANKLE AND JOINTS OF LEFT FOOT: ICD-10-CM

## 2024-05-22 DIAGNOSIS — M10.9 GOUT, UNSPECIFIED: ICD-10-CM

## 2024-05-22 PROCEDURE — 99214 OFFICE O/P EST MOD 30 MIN: CPT

## 2024-05-22 PROCEDURE — G2211 COMPLEX E/M VISIT ADD ON: CPT

## 2024-05-22 RX ORDER — ALLOPURINOL 100 MG/1
100 TABLET ORAL
Qty: 270 | Refills: 1 | Status: DISCONTINUED | COMMUNITY
Start: 2023-08-16 | End: 2024-05-22

## 2024-05-22 NOTE — HISTORY OF PRESENT ILLNESS
[FreeTextEntry1] : NOAH LAGUNAS is a 72 year old man who presents with severe polyarticular flares since this summer, was taken off torsemide as ?contributing. Responsive to prednisone tapers but some lingering arthralgias at some of the sites - R wrist, bottom of feet, R ankle. No gout prior, no clear food triggers, no FH of gout, Ortho last year noted ?tophi on R 2nd DIP but no treatment as wasn't having clear flares, same for his chronic hyperuricemia likely 2/2 chronic mild CKD. Currently off prednisone but on allopurinol 100mg and colchicine 0.6mg daily - tolerating well, diarrhea with higher colchicine doses. Arthralgias 2/10 on pain scale, ROM intact at present.   Labs - sUA 9 Negative - RF/CCP   ------ 2/22/24 -- No gout flares since last visit, improved R index finger ROM and smaller DIP nodule. Tolerating meds well.   5/22/4 -- Occasional R thumb and wrist and L ankle arthralgias, no swelling but latter causes mild limp x 3-4 days then self resolves, exacerbated by rotating the ankle. No gout flares. Tolerating ULT, off colchicine without issue.

## 2024-05-22 NOTE — PHYSICAL EXAM
[General Appearance - Alert] : alert [General Appearance - In No Acute Distress] : in no acute distress [General Appearance - Well Nourished] : well nourished [Outer Ear] : the ears and nose were normal in appearance [Neck Appearance] : the appearance of the neck was normal [No Spinal Tenderness] : no spinal tenderness [] : no rash [No Focal Deficits] : no focal deficits [Oriented To Time, Place, And Person] : oriented to person, place, and time [Impaired Insight] : insight and judgment were intact [Affect] : the affect was normal [Abnormal Walk] : normal gait [Nail Clubbing] : no clubbing  or cyanosis of the fingernails [Musculoskeletal - Swelling] : no joint swelling seen [Motor Tone] : muscle strength and tone were normal [FreeTextEntry1] : No synovitis or effusions, chronic R elbow contracture, mild lateral aspect soft tissue swelling over L ankle but ROM intact

## 2024-05-22 NOTE — ASSESSMENT
[FreeTextEntry1] : NOAH LAGUNAS is a 73 year old man with below --   # Recurrent polyarticular gout flares in last few months, likely multifactorial etiology - aging, CKD, prior diuretic use, hematologic process causing persistent leukocytosis/ thrombocytosis. Marked improvement s/p ULT initiation, no flares, tophi appears smaller,  XRays with OA changes but no gouty erosive changes  sUA in range  - c/w allopurinol 300mg  - sUA in range, repeat along with next routine CMP with PMD  - avoid consistent colchicine use but will try 1 tab PRN L ankle pain to see if improves possibly subclinical flare? - c/w increased PO hydration and avoidance of trigger foods - many of which he is already limiting - red meat, ETOH, shellfish   # mild, diffuse OA - topical Voltaren gel prn pain up to TID to affected peripheral joints for OA -related pain  - tylenol prn, avoid NSAIDS 2/2 CKD  - hand ROM exercises reviewed   All questions and concerns addressed to my best possible ability, patient verbalizes understanding and is agreeable. RTC 6 months

## 2024-06-24 RX ORDER — OLMESARTAN MEDOXOMIL 20 MG/1
20 TABLET, FILM COATED ORAL
Qty: 90 | Refills: 1 | Status: ACTIVE | COMMUNITY
Start: 2022-05-03 | End: 1900-01-01

## 2024-07-09 ENCOUNTER — LABORATORY RESULT (OUTPATIENT)
Age: 74
End: 2024-07-09

## 2024-07-11 ENCOUNTER — OUTPATIENT (OUTPATIENT)
Dept: OUTPATIENT SERVICES | Facility: HOSPITAL | Age: 74
LOS: 1 days | Discharge: HOME | End: 2024-07-11

## 2024-07-11 DIAGNOSIS — Z85.51 PERSONAL HISTORY OF MALIGNANT NEOPLASM OF BLADDER: Chronic | ICD-10-CM

## 2024-07-11 DIAGNOSIS — D47.1 CHRONIC MYELOPROLIFERATIVE DISEASE: ICD-10-CM

## 2024-07-11 DIAGNOSIS — Z98.890 OTHER SPECIFIED POSTPROCEDURAL STATES: Chronic | ICD-10-CM

## 2024-07-16 ENCOUNTER — APPOINTMENT (OUTPATIENT)
Dept: HEMATOLOGY ONCOLOGY | Facility: CLINIC | Age: 74
End: 2024-07-16
Payer: MEDICARE

## 2024-07-16 VITALS
BODY MASS INDEX: 27.97 KG/M2 | DIASTOLIC BLOOD PRESSURE: 68 MMHG | TEMPERATURE: 99.1 F | RESPIRATION RATE: 15 BRPM | HEART RATE: 81 BPM | OXYGEN SATURATION: 96 % | SYSTOLIC BLOOD PRESSURE: 116 MMHG | WEIGHT: 178.57 LBS

## 2024-07-16 DIAGNOSIS — D47.1 CHRONIC MYELOPROLIFERATIVE DISEASE: ICD-10-CM

## 2024-07-16 DIAGNOSIS — D72.829 ELEVATED WHITE BLOOD CELL COUNT, UNSPECIFIED: ICD-10-CM

## 2024-07-16 PROCEDURE — 99214 OFFICE O/P EST MOD 30 MIN: CPT

## 2024-07-16 PROCEDURE — G2211 COMPLEX E/M VISIT ADD ON: CPT

## 2024-07-17 ENCOUNTER — APPOINTMENT (OUTPATIENT)
Dept: FAMILY MEDICINE | Facility: CLINIC | Age: 74
End: 2024-07-17
Payer: MEDICARE

## 2024-07-17 VITALS
HEIGHT: 67 IN | BODY MASS INDEX: 27.78 KG/M2 | OXYGEN SATURATION: 98 % | HEART RATE: 79 BPM | RESPIRATION RATE: 14 BRPM | WEIGHT: 177 LBS | TEMPERATURE: 98.2 F | DIASTOLIC BLOOD PRESSURE: 63 MMHG | SYSTOLIC BLOOD PRESSURE: 120 MMHG

## 2024-07-17 DIAGNOSIS — I10 ESSENTIAL (PRIMARY) HYPERTENSION: ICD-10-CM

## 2024-07-17 DIAGNOSIS — E78.5 HYPERLIPIDEMIA, UNSPECIFIED: ICD-10-CM

## 2024-07-17 DIAGNOSIS — F41.9 ANXIETY DISORDER, UNSPECIFIED: ICD-10-CM

## 2024-07-17 DIAGNOSIS — M10.9 GOUT, UNSPECIFIED: ICD-10-CM

## 2024-07-17 PROCEDURE — 99214 OFFICE O/P EST MOD 30 MIN: CPT

## 2024-07-29 ENCOUNTER — NON-APPOINTMENT (OUTPATIENT)
Age: 74
End: 2024-07-29

## 2024-07-29 DIAGNOSIS — C64.9 MALIGNANT NEOPLASM OF UNSPECIFIED KIDNEY, EXCEPT RENAL PELVIS: ICD-10-CM

## 2024-07-29 DIAGNOSIS — Z84.1 FAMILY HISTORY OF DISORDERS OF KIDNEY AND URETER: ICD-10-CM

## 2024-07-29 DIAGNOSIS — L57.8 OTHER SKIN CHANGES DUE TO CHRONIC EXPOSURE TO NONIONIZING RADIATION: ICD-10-CM

## 2024-07-29 DIAGNOSIS — L57.0 ACTINIC KERATOSIS: ICD-10-CM

## 2024-07-29 DIAGNOSIS — L82.1 OTHER SEBORRHEIC KERATOSIS: ICD-10-CM

## 2024-08-12 ENCOUNTER — APPOINTMENT (OUTPATIENT)
Dept: NEPHROLOGY | Facility: CLINIC | Age: 74
End: 2024-08-12

## 2024-08-13 ENCOUNTER — APPOINTMENT (OUTPATIENT)
Dept: DERMATOLOGY | Facility: CLINIC | Age: 74
End: 2024-08-13
Payer: MEDICARE

## 2024-08-13 DIAGNOSIS — L57.0 ACTINIC KERATOSIS: ICD-10-CM

## 2024-08-13 PROCEDURE — 17003 DESTRUCT PREMALG LES 2-14: CPT

## 2024-08-13 PROCEDURE — 17000 DESTRUCT PREMALG LESION: CPT

## 2024-08-14 ENCOUNTER — LABORATORY RESULT (OUTPATIENT)
Age: 74
End: 2024-08-14

## 2024-08-16 ENCOUNTER — APPOINTMENT (OUTPATIENT)
Dept: RHEUMATOLOGY | Facility: CLINIC | Age: 74
End: 2024-08-16
Payer: MEDICARE

## 2024-08-16 VITALS
BODY MASS INDEX: 27.78 KG/M2 | TEMPERATURE: 97.4 F | SYSTOLIC BLOOD PRESSURE: 112 MMHG | WEIGHT: 177 LBS | HEIGHT: 67 IN | HEART RATE: 71 BPM | RESPIRATION RATE: 15 BRPM | DIASTOLIC BLOOD PRESSURE: 60 MMHG | OXYGEN SATURATION: 97 %

## 2024-08-16 DIAGNOSIS — M10.9 GOUT, UNSPECIFIED: ICD-10-CM

## 2024-08-16 DIAGNOSIS — M15.9 POLYOSTEOARTHRITIS, UNSPECIFIED: ICD-10-CM

## 2024-08-16 PROCEDURE — G2211 COMPLEX E/M VISIT ADD ON: CPT

## 2024-08-16 PROCEDURE — 99214 OFFICE O/P EST MOD 30 MIN: CPT

## 2024-08-16 NOTE — PHYSICAL EXAM
[General Appearance - Alert] : alert [General Appearance - In No Acute Distress] : in no acute distress [General Appearance - Well Nourished] : well nourished [Outer Ear] : the ears and nose were normal in appearance [Neck Appearance] : the appearance of the neck was normal [No Spinal Tenderness] : no spinal tenderness [Abnormal Walk] : normal gait [Nail Clubbing] : no clubbing  or cyanosis of the fingernails [Musculoskeletal - Swelling] : no joint swelling seen [Motor Tone] : muscle strength and tone were normal [] : no rash [No Focal Deficits] : no focal deficits [Oriented To Time, Place, And Person] : oriented to person, place, and time [Impaired Insight] : insight and judgment were intact [Affect] : the affect was normal [FreeTextEntry1] : No synovitis or effusions, chronic R elbow contracture, mild/stable lateral aspect soft tissue swelling over L ankle but ROM intact

## 2024-08-16 NOTE — HISTORY OF PRESENT ILLNESS
[FreeTextEntry1] : NOAH LAGUNAS is a 72 year old man who presents with severe polyarticular flares since this summer, was taken off torsemide as ?contributing. Responsive to prednisone tapers but some lingering arthralgias at some of the sites - R wrist, bottom of feet, R ankle. No gout prior, no clear food triggers, no FH of gout, Ortho last year noted ?tophi on R 2nd DIP but no treatment as wasn't having clear flares, same for his chronic hyperuricemia likely 2/2 chronic mild CKD. Currently off prednisone but on allopurinol 100mg and colchicine 0.6mg daily - tolerating well, diarrhea with higher colchicine doses. Arthralgias 2/10 on pain scale, ROM intact at present.   Labs - sUA 9 Negative - RF/CCP   ------ 2/22/24 -- No gout flares since last visit, improved R index finger ROM and smaller DIP nodule. Tolerating meds well.   5/22/4 -- Occasional R thumb and wrist and L ankle arthralgias, no swelling but latter causes mild limp x 3-4 days then self resolves, exacerbated by rotating the ankle. No gout flares. Tolerating ULT, off colchicine without issue.  8/16/24 -- No gout flares, still with overuse arthralgias and occasional dependent ankle edema but not limiting ADLs/weight bearing. Hasn't needed colchicine or steroids.

## 2024-08-16 NOTE — ASSESSMENT
[FreeTextEntry1] : NOAH LAGUNAS is a 73 year old man with below --   # Recurrent polyarticular gout flares in last few months, likely multifactorial etiology - aging, CKD, prior diuretic use, hematologic process causing persistent leukocytosis/ thrombocytosis. Marked improvement s/p ULT initiation, no flares, tophi nearly resolved, XRays with OA changes but no gouty erosive changes  - c/w allopurinol 300mg  - sUA in range, repeat along with next routine CMP with PMD's upcoming labs  - colchicine PRN flare, has steroid taper if colchcine not helpful  - c/w increased PO hydration and avoidance of trigger foods - many of which he is already limiting - red meat, ETOH, shellfish   # mild, diffuse OA. L ankle most active if overuses  - topical Voltaren gel prn pain up to TID to affected peripheral joints for OA -related pain  - tylenol prn, avoid NSAIDS 2/2 CKD   All questions and concerns addressed to my best possible ability, patient verbalizes understanding and is agreeable. Will be moving to [place], will establish with rheum there and call for transfer of chart

## 2024-08-23 NOTE — ASSESSMENT
[FreeTextEntry1] : Lab work, 8/16/24 rheumatology note reviewed. 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 Bone marrow biopsy was consistent with a GWEN 2 positive myelodysplastic/myeloproliferative neoplasm. Flow cytometry of the bone marrow aspirate lymphocytes showed no diagnostic abnormalities. Reticulin stain showed a slight increase and trichrome stain was negative for collagen (grade 1 fibrosis). Bone marrow aspirate iron stain showed iron stores present with ringed sideroblasts (less than 15% of cells). Quantitative BCR-ABL by RT-PCR negative. Cytogenetics showed a normal male karyotype. MDS FISH panel negative. --Maintained on Hydrea therapy, with adjustments based on interval lab work-had increased dose for elevated Hct. (holding on phlebotomy for now, with which patient agrees)-currently acceptable.   --Clinically stable currently --aspirin 81 mg daily.  --F/U lab work ordered. Should platelet count continue to rise consistently, may need to increase hydrea --PO fluid hydration as tolerated.  #2) Patient continues under urologic care at [facility] for his  malignancy.  #3) He is to continue follow-up with nephrologist for his kidney disease/h/o hyperkalemia, as well.  Patient was given the opportunity to ask questions. His questions have been answered to his apparent satisfaction at this time. He will no longer actively f/u in this office, but will continue under the care of his medical team in [place] going forward.   -->Hydroxyurea 500 MG Oral Capsule; take 2 capsules every Monday and Wed, and TAKE 1CAPSULE daily, remaining days of the week (though patient modifies to 2 caps every other Monday at times); Aspirin 81 mg PO daily. RTO with [place] medical team.

## 2024-08-23 NOTE — HISTORY OF PRESENT ILLNESS
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 Flow cytometry of the bone marrow aspirate lymphocytes showed no diagnostic abnormalities. Reticulin stain showed a slight increase and trichrome stain was negative for collagen (grade 1 fibrosis). Bone marrow aspirate iron stain showed iron stores present with ringed sideroblasts (less than 15% of cells). Quantitative BCR-ABL by RT-PCR negative. Cytogenetics showed a normal male karyotype. MDS FISH panel negative. Maintained on Hydrea therapy.\par  \par  History of renal cancer 10/2014-stage I(pT1a), status post right partial nephrectomy\par  \par  History of bladder cancer (noninvasive) 1994- status post TURBT. [de-identified] : Moving to [place]??  --On diet to lose weight. Moving to [place], [place] 9/6/24-to be closer to daughter. Taking at least 1 cap Hydrea daily and 2 on Wed. with an extra cap QOW on Mondays. Sees rheumatologist-has gouty arthritis.  Follows regularly with urologist (Dr. Valadez) for bladder tumors ([facility]). No current bleeding. Sees nephrologist Dr. Calderon, for kidney disease, hyperkalemia.  Sees cardiologist for BP management.  No fevers. No complaints of headache, pruritus, chest pain, shortness of breath, abdominal/pelvic pain. No cough.

## 2024-08-23 NOTE — PHYSICAL EXAM
[Fully active, able to carry on all pre-disease performance without restriction] : Status 0 - Fully active, able to carry on all pre-disease performance without restriction [Normal] : affect appropriate [de-identified] : non-toxic appearing [de-identified] : soft, NT; spleen tip palpable LUQ; +ventral hernia, NT

## 2024-08-26 ENCOUNTER — APPOINTMENT (OUTPATIENT)
Dept: HEMATOLOGY ONCOLOGY | Facility: CLINIC | Age: 74
End: 2024-08-26

## 2024-08-26 DIAGNOSIS — D72.829 ELEVATED WHITE BLOOD CELL COUNT, UNSPECIFIED: ICD-10-CM

## 2024-09-03 ENCOUNTER — APPOINTMENT (OUTPATIENT)
Dept: FAMILY MEDICINE | Facility: CLINIC | Age: 74
End: 2024-09-03
Payer: MEDICARE

## 2024-09-03 ENCOUNTER — LABORATORY RESULT (OUTPATIENT)
Age: 74
End: 2024-09-03

## 2024-09-03 VITALS
DIASTOLIC BLOOD PRESSURE: 62 MMHG | HEIGHT: 67 IN | SYSTOLIC BLOOD PRESSURE: 127 MMHG | HEART RATE: 68 BPM | BODY MASS INDEX: 28.25 KG/M2 | RESPIRATION RATE: 16 BRPM | WEIGHT: 180 LBS | TEMPERATURE: 98.4 F | OXYGEN SATURATION: 97 %

## 2024-09-03 DIAGNOSIS — I10 ESSENTIAL (PRIMARY) HYPERTENSION: ICD-10-CM

## 2024-09-03 DIAGNOSIS — N18.30 CHRONIC KIDNEY DISEASE, STAGE 3 UNSPECIFIED: ICD-10-CM

## 2024-09-03 DIAGNOSIS — E79.0 HYPERURICEMIA W/OUT SIGNS OF INFLAMMATORY ARTHRITIS AND TOPHACEOUS DISEASE: ICD-10-CM

## 2024-09-03 DIAGNOSIS — D47.1 CHRONIC MYELOPROLIFERATIVE DISEASE: ICD-10-CM

## 2024-09-03 DIAGNOSIS — I25.10 ATHEROSCLEROTIC HEART DISEASE OF NATIVE CORONARY ARTERY W/OUT ANGINA PECTORIS: ICD-10-CM

## 2024-09-03 DIAGNOSIS — F41.9 ANXIETY DISORDER, UNSPECIFIED: ICD-10-CM

## 2024-09-03 DIAGNOSIS — E78.5 HYPERLIPIDEMIA, UNSPECIFIED: ICD-10-CM

## 2024-09-03 PROCEDURE — 36415 COLL VENOUS BLD VENIPUNCTURE: CPT

## 2024-09-03 PROCEDURE — 99214 OFFICE O/P EST MOD 30 MIN: CPT

## 2024-09-03 NOTE — HEALTH RISK ASSESSMENT
[Never (0 pts)] : Never (0 points) [No] : In the past 12 months have you used drugs other than those required for medical reasons? No [No falls in past year] : Patient reported no falls in the past year [Little interest or pleasure doing things] : 1) Little interest or pleasure doing things [Feeling down, depressed, or hopeless] : 2) Feeling down, depressed, or hopeless [0] : 2) Feeling down, depressed, or hopeless: Not at all (0) [PHQ-2 Negative - No further assessment needed] : PHQ-2 Negative - No further assessment needed [Audit-CScore] : 0 [[id number]] : 0 [Reviewed no changes] : Reviewed, no changes [Designated Healthcare Proxy] : Designated healthcare proxy [Relationship: ___] : Relationship: [unfilled] [Aggressive treatment] : aggressive treatment [I will adhere to the patient's wishes.] : I will adhere to the patient's wishes. [AdvancecareDate] : 09/24 [Former] : Former [20 or more] : 20 or more [> 15 Years] : > 15 Years

## 2024-09-03 NOTE — HISTORY OF PRESENT ILLNESS
[FreeTextEntry1] : Follow-up and disease management.. [de-identified] : Patient is 73-year-old gentleman who presents today for follow-up and disease management.  Medical history is significant for hyperuricemia, ischemic heart disease, hypertension, chronic kidney disease, myeloproliferative disorder and hyperlipidemia.  Chart was reviewed patient was seen by his nephrologist Dr. Calderon and subsequently in the interim he was also seen by his cardiologist.  Patient has been treated with prednisone taper and placed on colchicine 0.6 mg p.o. daily and allopurinol 100 mg p.o. daily.  Patient did have a good response to prednisone but this morning he presents with podagra  middle toe of right foot.

## 2024-09-03 NOTE — PHYSICAL EXAM
[No Acute Distress] : no acute distress [Well Nourished] : well nourished [Well Developed] : well developed [Well-Appearing] : well-appearing [Normal Sclera/Conjunctiva] : normal sclera/conjunctiva [PERRL] : pupils equal round and reactive to light [EOMI] : extraocular movements intact [Normal Outer Ear/Nose] : the outer ears and nose were normal in appearance [Normal Oropharynx] : the oropharynx was normal [No JVD] : no jugular venous distention [No Lymphadenopathy] : no lymphadenopathy [Supple] : supple [Thyroid Normal, No Nodules] : the thyroid was normal and there were no nodules present [No Respiratory Distress] : no respiratory distress  [No Accessory Muscle Use] : no accessory muscle use [Clear to Auscultation] : lungs were clear to auscultation bilaterally [Normal Rate] : normal rate  [Regular Rhythm] : with a regular rhythm [Normal S1, S2] : normal S1 and S2 [No Murmur] : no murmur heard [No Carotid Bruits] : no carotid bruits [No Abdominal Bruit] : a ~M bruit was not heard ~T in the abdomen [No Varicosities] : no varicosities [Pedal Pulses Present] : the pedal pulses are present [No Edema] : there was no peripheral edema [No Palpable Aorta] : no palpable aorta [No Extremity Clubbing/Cyanosis] : no extremity clubbing/cyanosis [Soft] : abdomen soft [Non Tender] : non-tender [Non-distended] : non-distended [No Masses] : no abdominal mass palpated [No HSM] : no HSM [Normal Bowel Sounds] : normal bowel sounds [Normal Posterior Cervical Nodes] : no posterior cervical lymphadenopathy [Normal Anterior Cervical Nodes] : no anterior cervical lymphadenopathy [No CVA Tenderness] : no CVA  tenderness [No Spinal Tenderness] : no spinal tenderness [No Rash] : no rash [Coordination Grossly Intact] : coordination grossly intact [No Focal Deficits] : no focal deficits [Normal Gait] : normal gait [Deep Tendon Reflexes (DTR)] : deep tendon reflexes were 2+ and symmetric [Speech Grossly Normal] : speech grossly normal [Memory Grossly Normal] : memory grossly normal [Normal Affect] : the affect was normal [Alert and Oriented x3] : oriented to person, place, and time [Normal Mood] : the mood was normal [Normal Insight/Judgement] : insight and judgment were intact [Comprehensive Foot Exam Normal] : Right and left foot were examined and both feet are normal. No ulcers in either foot. Toes are normal and with full ROM.  Normal tactile sensation with monofilament testing throughout both feet [de-identified] : Right wrist pain much improved in fact the swelling has resolved now it has affected right middle toe of right  and swollen and red.

## 2024-09-03 NOTE — ASSESSMENT
[FreeTextEntry1] : Assessment and plan:  1.  Generalized anxiety presently well-controlled with clonazepam 1 mg it is prescribed as 1 mg every 12 hours as needed patient takes medications as prescribed without any sign of abuse.  I stop checked.  2.  Hypertension with history of chronic renal insufficiency patient's blood pressure is presently under excellent control patient will continue amlodipine 10 mg, clonidine 0.1 mg twice a day, olmesartan 20 mg.  Patient will continue beta-blocker for cardiac health metoprolol succinate ER 25 mg.  Blood pressure , today's blood pressure stable for patient I recommend no change in medical management.  Today's blood pressure 127/62 excellent control.  3.  Chronic renal insufficiency stage III stable for patient patient keeps himself well-hydrated at least 64 ounces of fluids daily.  4.  Hyperuricemia continue allopurinol 300 mg p.o. daily.  5.  Hyperlipidemia, mixed patient will continue atorvastatin 20 mg p.o. daily of course patient follows a low-fat low-cholesterol diet.  6.  Total time spent face-to-face and non-face-to-face time 35 minutes the majority of which was spent on counseling and coordination of care.  At today's visit comprehensive blood work drawn in office by examiner.

## 2024-09-04 LAB
ALBUMIN SERPL ELPH-MCNC: 4.8 G/DL
ALP BLD-CCNC: 69 U/L
ALT SERPL-CCNC: 17 U/L
ANION GAP SERPL CALC-SCNC: 11 MMOL/L
AST SERPL-CCNC: 19 U/L
BASOPHILS # BLD AUTO: 0.52 K/UL
BASOPHILS NFR BLD AUTO: 3.5 %
BILIRUB SERPL-MCNC: 0.6 MG/DL
BUN SERPL-MCNC: 21 MG/DL
CALCIUM SERPL-MCNC: 9.6 MG/DL
CALCIUM SERPL-MCNC: 9.6 MG/DL
CHLORIDE SERPL-SCNC: 105 MMOL/L
CHOLEST SERPL-MCNC: 109 MG/DL
CO2 SERPL-SCNC: 26 MMOL/L
CREAT SERPL-MCNC: 1.05 MG/DL
EGFR: 74 ML/MIN/1.73M2
EOSINOPHIL # BLD AUTO: 0.66 K/UL
EOSINOPHIL NFR BLD AUTO: 4.4 %
GLUCOSE SERPL-MCNC: 110 MG/DL
HCT VFR BLD CALC: 45.2 %
HCV AB SER QL: NONREACTIVE
HCV S/CO RATIO: 0.06 S/CO
HDLC SERPL-MCNC: 34 MG/DL
HGB BLD-MCNC: 14.3 G/DL
LDLC SERPL CALC-MCNC: 58 MG/DL
LYMPHOCYTES # BLD AUTO: 1.3 K/UL
LYMPHOCYTES NFR BLD AUTO: 8.7 %
MAN DIFF?: NORMAL
MCHC RBC-ENTMCNC: 31.6 GM/DL
MCHC RBC-ENTMCNC: 36 PG
MCV RBC AUTO: 113.9 FL
MONOCYTES # BLD AUTO: 0.25 K/UL
MONOCYTES NFR BLD AUTO: 1.7 %
NEUTROPHILS # BLD AUTO: 11.7 K/UL
NEUTROPHILS NFR BLD AUTO: 76.5 %
NONHDLC SERPL-MCNC: 76 MG/DL
PARATHYROID HORMONE INTACT: 27 PG/ML
PLATELET # BLD AUTO: 434 K/UL
POTASSIUM SERPL-SCNC: 4.8 MMOL/L
PROT SERPL-MCNC: 6.5 G/DL
RBC # BLD: 3.97 M/UL
RBC # FLD: 16.1 %
SODIUM SERPL-SCNC: 142 MMOL/L
TRIGL SERPL-MCNC: 91 MG/DL
URATE SERPL-MCNC: 4.4 MG/DL
WBC # FLD AUTO: 14.96 K/UL

## 2024-10-25 ENCOUNTER — APPOINTMENT (OUTPATIENT)
Dept: DERMATOLOGY | Facility: CLINIC | Age: 74
End: 2024-10-25

## 2024-11-14 ENCOUNTER — APPOINTMENT (OUTPATIENT)
Dept: RHEUMATOLOGY | Facility: CLINIC | Age: 74
End: 2024-11-14

## 2025-03-03 ENCOUNTER — OUTPATIENT (OUTPATIENT)
Dept: OUTPATIENT SERVICES | Facility: HOSPITAL | Age: 75
LOS: 1 days | Discharge: HOME | End: 2025-03-03

## 2025-03-03 DIAGNOSIS — Z98.890 OTHER SPECIFIED POSTPROCEDURAL STATES: Chronic | ICD-10-CM

## 2025-03-03 DIAGNOSIS — Z85.51 PERSONAL HISTORY OF MALIGNANT NEOPLASM OF BLADDER: Chronic | ICD-10-CM

## 2025-03-03 DIAGNOSIS — D47.1 CHRONIC MYELOPROLIFERATIVE DISEASE: ICD-10-CM

## 2025-03-05 ENCOUNTER — RESULT REVIEW (OUTPATIENT)
Age: 75
End: 2025-03-05

## 2025-03-05 ENCOUNTER — NON-APPOINTMENT (OUTPATIENT)
Age: 75
End: 2025-03-05

## 2025-03-05 ENCOUNTER — APPOINTMENT (OUTPATIENT)
Dept: HEMATOLOGY ONCOLOGY | Facility: CLINIC | Age: 75
End: 2025-03-05
Payer: MEDICARE

## 2025-03-05 VITALS
BODY MASS INDEX: 29 KG/M2 | TEMPERATURE: 97.7 F | WEIGHT: 185.19 LBS | DIASTOLIC BLOOD PRESSURE: 67 MMHG | OXYGEN SATURATION: 97 % | HEART RATE: 70 BPM | SYSTOLIC BLOOD PRESSURE: 133 MMHG | RESPIRATION RATE: 16 BRPM

## 2025-03-05 LAB
BASOPHILS # BLD AUTO: 0.25 K/UL — HIGH (ref 0–0.2)
BASOPHILS NFR BLD AUTO: 1.9 % — SIGNIFICANT CHANGE UP (ref 0–2)
EOSINOPHIL # BLD AUTO: 0.29 K/UL — SIGNIFICANT CHANGE UP (ref 0–0.5)
EOSINOPHIL NFR BLD AUTO: 2.2 % — SIGNIFICANT CHANGE UP (ref 0–6)
HCT VFR BLD CALC: 42.5 % — SIGNIFICANT CHANGE UP (ref 39–50)
HGB BLD-MCNC: 14.1 G/DL — SIGNIFICANT CHANGE UP (ref 13–17)
IMM GRANULOCYTES NFR BLD AUTO: 3.5 % — HIGH (ref 0–0.9)
LYMPHOCYTES # BLD AUTO: 1.02 K/UL — SIGNIFICANT CHANGE UP (ref 1–3.3)
LYMPHOCYTES # BLD AUTO: 7.7 % — LOW (ref 13–44)
MCHC RBC-ENTMCNC: 33.2 G/DL — SIGNIFICANT CHANGE UP (ref 32–36)
MCHC RBC-ENTMCNC: 35.4 PG — HIGH (ref 27–34)
MCV RBC AUTO: 106.8 FL — HIGH (ref 80–100)
MONOCYTES # BLD AUTO: 0.69 K/UL — SIGNIFICANT CHANGE UP (ref 0–0.9)
MONOCYTES NFR BLD AUTO: 5.2 % — SIGNIFICANT CHANGE UP (ref 2–14)
NEUTROPHILS # BLD AUTO: 10.58 K/UL — HIGH (ref 1.8–7.4)
NEUTROPHILS NFR BLD AUTO: 79.5 % — HIGH (ref 43–77)
NRBC BLD AUTO-RTO: 0 /100 WBCS — SIGNIFICANT CHANGE UP (ref 0–0)
PLATELET # BLD AUTO: 381 K/UL — SIGNIFICANT CHANGE UP (ref 150–400)
RBC # BLD: 3.98 M/UL — LOW (ref 4.2–5.8)
RBC # FLD: 17.4 % — HIGH (ref 10.3–14.5)
WBC # BLD: 13.29 K/UL — HIGH (ref 3.8–10.5)
WBC # FLD AUTO: 13.29 K/UL — HIGH (ref 3.8–10.5)

## 2025-03-05 PROCEDURE — G2211 COMPLEX E/M VISIT ADD ON: CPT

## 2025-03-05 PROCEDURE — 99214 OFFICE O/P EST MOD 30 MIN: CPT

## 2025-03-06 ENCOUNTER — APPOINTMENT (OUTPATIENT)
Dept: RHEUMATOLOGY | Facility: CLINIC | Age: 75
End: 2025-03-06
Payer: MEDICARE

## 2025-03-06 ENCOUNTER — APPOINTMENT (OUTPATIENT)
Dept: FAMILY MEDICINE | Facility: CLINIC | Age: 75
End: 2025-03-06
Payer: MEDICARE

## 2025-03-06 VITALS
DIASTOLIC BLOOD PRESSURE: 66 MMHG | BODY MASS INDEX: 29.35 KG/M2 | HEART RATE: 75 BPM | WEIGHT: 187 LBS | SYSTOLIC BLOOD PRESSURE: 129 MMHG | TEMPERATURE: 98 F | RESPIRATION RATE: 16 BRPM | HEIGHT: 67 IN | OXYGEN SATURATION: 98 %

## 2025-03-06 DIAGNOSIS — D47.1 CHRONIC MYELOPROLIFERATIVE DISEASE: ICD-10-CM

## 2025-03-06 DIAGNOSIS — I10 ESSENTIAL (PRIMARY) HYPERTENSION: ICD-10-CM

## 2025-03-06 DIAGNOSIS — E78.5 HYPERLIPIDEMIA, UNSPECIFIED: ICD-10-CM

## 2025-03-06 DIAGNOSIS — C67.9 MALIGNANT NEOPLASM OF BLADDER, UNSPECIFIED: ICD-10-CM

## 2025-03-06 DIAGNOSIS — M10.9 GOUT, UNSPECIFIED: ICD-10-CM

## 2025-03-06 DIAGNOSIS — N18.30 CHRONIC KIDNEY DISEASE, STAGE 3 UNSPECIFIED: ICD-10-CM

## 2025-03-06 DIAGNOSIS — C64.9 MALIGNANT NEOPLASM OF UNSPECIFIED KIDNEY, EXCEPT RENAL PELVIS: ICD-10-CM

## 2025-03-06 DIAGNOSIS — M15.9 POLYOSTEOARTHRITIS, UNSPECIFIED: ICD-10-CM

## 2025-03-06 DIAGNOSIS — F41.9 ANXIETY DISORDER, UNSPECIFIED: ICD-10-CM

## 2025-03-06 PROCEDURE — 99215 OFFICE O/P EST HI 40 MIN: CPT

## 2025-03-06 PROCEDURE — G2211 COMPLEX E/M VISIT ADD ON: CPT

## 2025-03-06 PROCEDURE — 99214 OFFICE O/P EST MOD 30 MIN: CPT

## 2025-03-07 ENCOUNTER — APPOINTMENT (OUTPATIENT)
Dept: DERMATOLOGY | Facility: CLINIC | Age: 75
End: 2025-03-07

## 2025-06-06 ENCOUNTER — RX RENEWAL (OUTPATIENT)
Age: 75
End: 2025-06-06

## 2025-08-14 ENCOUNTER — RX RENEWAL (OUTPATIENT)
Age: 75
End: 2025-08-14